# Patient Record
Sex: FEMALE | Race: BLACK OR AFRICAN AMERICAN | NOT HISPANIC OR LATINO
[De-identification: names, ages, dates, MRNs, and addresses within clinical notes are randomized per-mention and may not be internally consistent; named-entity substitution may affect disease eponyms.]

---

## 2017-03-15 ENCOUNTER — APPOINTMENT (OUTPATIENT)
Dept: OBGYN | Facility: CLINIC | Age: 27
End: 2017-03-15

## 2017-03-15 VITALS
BODY MASS INDEX: 34.36 KG/M2 | DIASTOLIC BLOOD PRESSURE: 84 MMHG | HEIGHT: 69 IN | WEIGHT: 232 LBS | SYSTOLIC BLOOD PRESSURE: 136 MMHG

## 2017-03-17 LAB
C TRACH RRNA SPEC QL NAA+PROBE: NORMAL
N GONORRHOEA RRNA SPEC QL NAA+PROBE: NORMAL
SOURCE TP AMPLIFICATION: NORMAL

## 2017-03-21 LAB — CYTOLOGY CVX/VAG DOC THIN PREP: NORMAL

## 2017-03-22 ENCOUNTER — APPOINTMENT (OUTPATIENT)
Dept: OBGYN | Facility: CLINIC | Age: 27
End: 2017-03-22

## 2017-04-05 ENCOUNTER — CHART COPY (OUTPATIENT)
Age: 27
End: 2017-04-05

## 2017-05-17 ENCOUNTER — APPOINTMENT (OUTPATIENT)
Dept: OBGYN | Facility: CLINIC | Age: 27
End: 2017-05-17

## 2018-04-18 ENCOUNTER — APPOINTMENT (OUTPATIENT)
Dept: OBGYN | Facility: CLINIC | Age: 28
End: 2018-04-18
Payer: COMMERCIAL

## 2018-04-18 VITALS
BODY MASS INDEX: 35.84 KG/M2 | DIASTOLIC BLOOD PRESSURE: 88 MMHG | SYSTOLIC BLOOD PRESSURE: 130 MMHG | HEIGHT: 69 IN | WEIGHT: 242 LBS

## 2018-04-18 LAB
HCG UR QL: POSITIVE
QUALITY CONTROL: YES

## 2018-04-18 PROCEDURE — 99213 OFFICE O/P EST LOW 20 MIN: CPT | Mod: 25

## 2018-04-18 PROCEDURE — 99395 PREV VISIT EST AGE 18-39: CPT

## 2018-04-18 PROCEDURE — 76817 TRANSVAGINAL US OBSTETRIC: CPT

## 2018-04-18 PROCEDURE — 81025 URINE PREGNANCY TEST: CPT

## 2018-04-19 ENCOUNTER — LABORATORY RESULT (OUTPATIENT)
Age: 28
End: 2018-04-19

## 2018-04-21 ENCOUNTER — LABORATORY RESULT (OUTPATIENT)
Age: 28
End: 2018-04-21

## 2018-04-23 LAB — CYTOLOGY CVX/VAG DOC THIN PREP: NORMAL

## 2018-04-24 ENCOUNTER — APPOINTMENT (OUTPATIENT)
Dept: OBGYN | Facility: CLINIC | Age: 28
End: 2018-04-24
Payer: COMMERCIAL

## 2018-04-24 ENCOUNTER — ASOB RESULT (OUTPATIENT)
Age: 28
End: 2018-04-24

## 2018-04-24 PROCEDURE — 76817 TRANSVAGINAL US OBSTETRIC: CPT

## 2018-05-01 ENCOUNTER — APPOINTMENT (OUTPATIENT)
Dept: ANTEPARTUM | Facility: CLINIC | Age: 28
End: 2018-05-01
Payer: COMMERCIAL

## 2018-05-01 ENCOUNTER — ASOB RESULT (OUTPATIENT)
Age: 28
End: 2018-05-01

## 2018-05-01 ENCOUNTER — APPOINTMENT (OUTPATIENT)
Dept: OBGYN | Facility: CLINIC | Age: 28
End: 2018-05-01
Payer: COMMERCIAL

## 2018-05-01 ENCOUNTER — NON-APPOINTMENT (OUTPATIENT)
Age: 28
End: 2018-05-01

## 2018-05-01 VITALS
BODY MASS INDEX: 35.4 KG/M2 | HEIGHT: 69 IN | SYSTOLIC BLOOD PRESSURE: 123 MMHG | WEIGHT: 239 LBS | DIASTOLIC BLOOD PRESSURE: 88 MMHG

## 2018-05-01 PROCEDURE — 0501F PRENATAL FLOW SHEET: CPT

## 2018-05-01 PROCEDURE — 76817 TRANSVAGINAL US OBSTETRIC: CPT

## 2018-05-12 ENCOUNTER — LABORATORY RESULT (OUTPATIENT)
Age: 28
End: 2018-05-12

## 2018-05-14 ENCOUNTER — LABORATORY RESULT (OUTPATIENT)
Age: 28
End: 2018-05-14

## 2018-05-22 ENCOUNTER — APPOINTMENT (OUTPATIENT)
Dept: OBGYN | Facility: CLINIC | Age: 28
End: 2018-05-22

## 2018-05-30 ENCOUNTER — APPOINTMENT (OUTPATIENT)
Dept: OBGYN | Facility: CLINIC | Age: 28
End: 2018-05-30
Payer: COMMERCIAL

## 2018-05-30 VITALS
DIASTOLIC BLOOD PRESSURE: 86 MMHG | WEIGHT: 240 LBS | SYSTOLIC BLOOD PRESSURE: 130 MMHG | HEIGHT: 69 IN | BODY MASS INDEX: 35.55 KG/M2

## 2018-05-30 PROCEDURE — 0502F SUBSEQUENT PRENATAL CARE: CPT

## 2018-06-01 ENCOUNTER — APPOINTMENT (OUTPATIENT)
Dept: ANTEPARTUM | Facility: CLINIC | Age: 28
End: 2018-06-01

## 2018-06-11 ENCOUNTER — APPOINTMENT (OUTPATIENT)
Dept: ANTEPARTUM | Facility: CLINIC | Age: 28
End: 2018-06-11

## 2018-06-11 ENCOUNTER — APPOINTMENT (OUTPATIENT)
Dept: ANTEPARTUM | Facility: CLINIC | Age: 28
End: 2018-06-11
Payer: COMMERCIAL

## 2018-06-11 ENCOUNTER — ASOB RESULT (OUTPATIENT)
Age: 28
End: 2018-06-11

## 2018-06-11 PROCEDURE — 36416 COLLJ CAPILLARY BLOOD SPEC: CPT

## 2018-06-11 PROCEDURE — 76813 OB US NUCHAL MEAS 1 GEST: CPT

## 2018-06-12 ENCOUNTER — TRANSCRIPTION ENCOUNTER (OUTPATIENT)
Age: 28
End: 2018-06-12

## 2018-06-14 LAB
1ST TRIMESTER DATA: NORMAL
ADDENDUM DOC: NORMAL
AFP PNL SERPL: NORMAL
AFP SERPL-ACNC: NORMAL
CLINICAL BIOCHEMIST REVIEW: NORMAL
FREE BETA HCG 1ST TRIMESTER: NORMAL
Lab: NORMAL
NOTES NTD: NORMAL
NT: NORMAL
PAPP-A SERPL-ACNC: NORMAL
TRISOMY 18/3: NORMAL

## 2018-06-20 ENCOUNTER — APPOINTMENT (OUTPATIENT)
Dept: ANTEPARTUM | Facility: CLINIC | Age: 28
End: 2018-06-20

## 2018-06-20 ENCOUNTER — ASOB RESULT (OUTPATIENT)
Age: 28
End: 2018-06-20

## 2018-06-20 ENCOUNTER — APPOINTMENT (OUTPATIENT)
Dept: MATERNAL FETAL MEDICINE | Facility: CLINIC | Age: 28
End: 2018-06-20
Payer: COMMERCIAL

## 2018-06-20 ENCOUNTER — TRANSCRIPTION ENCOUNTER (OUTPATIENT)
Age: 28
End: 2018-06-20

## 2018-06-20 PROCEDURE — 99241 OFFICE CONSULTATION NEW/ESTAB PATIENT 15 MIN: CPT

## 2018-06-26 ENCOUNTER — APPOINTMENT (OUTPATIENT)
Dept: OBGYN | Facility: CLINIC | Age: 28
End: 2018-06-26
Payer: COMMERCIAL

## 2018-06-26 VITALS
BODY MASS INDEX: 37.33 KG/M2 | WEIGHT: 252 LBS | HEIGHT: 69 IN | SYSTOLIC BLOOD PRESSURE: 138 MMHG | DIASTOLIC BLOOD PRESSURE: 84 MMHG

## 2018-06-26 PROCEDURE — 0502F SUBSEQUENT PRENATAL CARE: CPT

## 2018-07-11 ENCOUNTER — APPOINTMENT (OUTPATIENT)
Dept: OBGYN | Facility: CLINIC | Age: 28
End: 2018-07-11
Payer: COMMERCIAL

## 2018-07-11 VITALS
WEIGHT: 255 LBS | DIASTOLIC BLOOD PRESSURE: 90 MMHG | SYSTOLIC BLOOD PRESSURE: 128 MMHG | BODY MASS INDEX: 37.77 KG/M2 | HEIGHT: 69 IN

## 2018-07-11 PROCEDURE — 0502F SUBSEQUENT PRENATAL CARE: CPT

## 2018-07-30 ENCOUNTER — APPOINTMENT (OUTPATIENT)
Dept: ANTEPARTUM | Facility: CLINIC | Age: 28
End: 2018-07-30
Payer: COMMERCIAL

## 2018-07-30 ENCOUNTER — ASOB RESULT (OUTPATIENT)
Age: 28
End: 2018-07-30

## 2018-07-30 PROCEDURE — 76805 OB US >/= 14 WKS SNGL FETUS: CPT

## 2018-08-06 LAB
1ST TRIMESTER DATA: NORMAL
2ND TRIMESTER DATA: NORMAL
AFP PNL SERPL: NORMAL
AFP SERPL-ACNC: NORMAL
AFP SERPL-ACNC: NORMAL
B-HCG FREE SERPL-MCNC: NORMAL
CLINICAL BIOCHEMIST REVIEW: NORMAL
FREE BETA HCG 1ST TRIMESTER: NORMAL
INHIBIN A SERPL-MCNC: NORMAL
NOTES NTD: NORMAL
NT: NORMAL
PAPP-A SERPL-ACNC: NORMAL
U ESTRIOL SERPL-SCNC: NORMAL

## 2018-08-09 ENCOUNTER — APPOINTMENT (OUTPATIENT)
Dept: OBGYN | Facility: CLINIC | Age: 28
End: 2018-08-09
Payer: COMMERCIAL

## 2018-08-09 PROCEDURE — 0502F SUBSEQUENT PRENATAL CARE: CPT

## 2018-08-15 ENCOUNTER — APPOINTMENT (OUTPATIENT)
Dept: OBGYN | Facility: CLINIC | Age: 28
End: 2018-08-15
Payer: COMMERCIAL

## 2018-08-15 VITALS
WEIGHT: 271 LBS | DIASTOLIC BLOOD PRESSURE: 84 MMHG | BODY MASS INDEX: 40.14 KG/M2 | SYSTOLIC BLOOD PRESSURE: 128 MMHG | HEIGHT: 69 IN

## 2018-08-15 PROCEDURE — 0502F SUBSEQUENT PRENATAL CARE: CPT

## 2018-09-05 ENCOUNTER — APPOINTMENT (OUTPATIENT)
Dept: ANTEPARTUM | Facility: CLINIC | Age: 28
End: 2018-09-05
Payer: COMMERCIAL

## 2018-09-05 ENCOUNTER — APPOINTMENT (OUTPATIENT)
Dept: MATERNAL FETAL MEDICINE | Facility: CLINIC | Age: 28
End: 2018-09-05
Payer: COMMERCIAL

## 2018-09-05 ENCOUNTER — ASOB RESULT (OUTPATIENT)
Age: 28
End: 2018-09-05

## 2018-09-05 VITALS
HEIGHT: 69 IN | DIASTOLIC BLOOD PRESSURE: 84 MMHG | SYSTOLIC BLOOD PRESSURE: 148 MMHG | HEART RATE: 98 BPM | BODY MASS INDEX: 41.92 KG/M2 | WEIGHT: 283 LBS

## 2018-09-05 DIAGNOSIS — B97.7 PAPILLOMAVIRUS AS THE CAUSE OF DISEASES CLASSIFIED ELSEWHERE: ICD-10-CM

## 2018-09-05 DIAGNOSIS — Z82.69 FAMILY HISTORY OF OTHER DISEASES OF THE MUSCULOSKELETAL SYSTEM AND CONNECTIVE TISSUE: ICD-10-CM

## 2018-09-05 DIAGNOSIS — I10 ESSENTIAL (PRIMARY) HYPERTENSION: ICD-10-CM

## 2018-09-05 DIAGNOSIS — Z09 ENCOUNTER FOR FOLLOW-UP EXAMINATION AFTER COMPLETED TREATMENT FOR CONDITIONS OTHER THAN MALIGNANT NEOPLASM: ICD-10-CM

## 2018-09-05 DIAGNOSIS — Z87.42 PERSONAL HISTORY OF OTHER DISEASES OF THE FEMALE GENITAL TRACT: ICD-10-CM

## 2018-09-05 DIAGNOSIS — Z30.9 ENCOUNTER FOR CONTRACEPTIVE MANAGEMENT, UNSPECIFIED: ICD-10-CM

## 2018-09-05 DIAGNOSIS — Z32.01 ENCOUNTER FOR PREGNANCY TEST, RESULT POSITIVE: ICD-10-CM

## 2018-09-05 DIAGNOSIS — O21.9 VOMITING OF PREGNANCY, UNSPECIFIED: ICD-10-CM

## 2018-09-05 PROCEDURE — 99244 OFF/OP CNSLTJ NEW/EST MOD 40: CPT

## 2018-09-05 PROCEDURE — 76816 OB US FOLLOW-UP PER FETUS: CPT

## 2018-09-05 RX ORDER — IBUPROFEN 600 MG/1
600 TABLET, FILM COATED ORAL EVERY 6 HOURS
Qty: 40 | Refills: 0 | Status: DISCONTINUED | COMMUNITY
Start: 2017-04-05 | End: 2018-09-05

## 2018-09-05 RX ORDER — DOXYLAMINE SUCCINATE AND PYRIDOXINE HYDROCHLORIDE 10; 10 MG/1; MG/1
10-10 TABLET, DELAYED RELEASE ORAL
Qty: 60 | Refills: 1 | Status: DISCONTINUED | COMMUNITY
Start: 2018-05-04 | End: 2018-09-05

## 2018-09-05 RX ORDER — METHYLDOPA 250 MG/1
250 TABLET, FILM COATED ORAL
Qty: 60 | Refills: 1 | Status: DISCONTINUED | COMMUNITY
Start: 2018-08-15 | End: 2018-09-05

## 2018-09-05 RX ORDER — ONDANSETRON 4 MG/1
4 TABLET ORAL
Qty: 20 | Refills: 1 | Status: DISCONTINUED | COMMUNITY
Start: 2018-05-09 | End: 2018-09-05

## 2018-09-05 RX ORDER — METRONIDAZOLE 7.5 MG/G
0.75 GEL VAGINAL
Qty: 5 | Refills: 2 | Status: DISCONTINUED | COMMUNITY
Start: 2017-03-21 | End: 2018-09-05

## 2018-09-05 RX ORDER — IBUPROFEN 600 MG/1
600 TABLET, FILM COATED ORAL 3 TIMES DAILY
Qty: 30 | Refills: 1 | Status: DISCONTINUED | COMMUNITY
Start: 2017-04-27 | End: 2018-09-05

## 2018-09-05 RX ORDER — LABETALOL HYDROCHLORIDE 200 MG/1
200 TABLET, FILM COATED ORAL
Qty: 120 | Refills: 0 | Status: DISCONTINUED | COMMUNITY
Start: 2018-08-09 | End: 2018-09-05

## 2018-09-06 ENCOUNTER — APPOINTMENT (OUTPATIENT)
Dept: OBGYN | Facility: CLINIC | Age: 28
End: 2018-09-06

## 2018-09-17 ENCOUNTER — APPOINTMENT (OUTPATIENT)
Dept: OBGYN | Facility: CLINIC | Age: 28
End: 2018-09-17
Payer: COMMERCIAL

## 2018-09-17 PROCEDURE — 0502F SUBSEQUENT PRENATAL CARE: CPT

## 2018-09-24 ENCOUNTER — LABORATORY RESULT (OUTPATIENT)
Age: 28
End: 2018-09-24

## 2018-09-26 ENCOUNTER — APPOINTMENT (OUTPATIENT)
Dept: OBGYN | Facility: CLINIC | Age: 28
End: 2018-09-26
Payer: COMMERCIAL

## 2018-09-26 VITALS
DIASTOLIC BLOOD PRESSURE: 80 MMHG | BODY MASS INDEX: 42.36 KG/M2 | WEIGHT: 286 LBS | SYSTOLIC BLOOD PRESSURE: 125 MMHG | HEIGHT: 69 IN

## 2018-09-26 PROCEDURE — 59426 ANTEPARTUM CARE ONLY: CPT

## 2018-09-26 PROCEDURE — 0502F SUBSEQUENT PRENATAL CARE: CPT

## 2018-10-03 ENCOUNTER — APPOINTMENT (OUTPATIENT)
Dept: ANTEPARTUM | Facility: CLINIC | Age: 28
End: 2018-10-03
Payer: MEDICAID

## 2018-10-03 ENCOUNTER — ASOB RESULT (OUTPATIENT)
Age: 28
End: 2018-10-03

## 2018-10-03 ENCOUNTER — APPOINTMENT (OUTPATIENT)
Dept: MATERNAL FETAL MEDICINE | Facility: CLINIC | Age: 28
End: 2018-10-03
Payer: MEDICAID

## 2018-10-03 VITALS
BODY MASS INDEX: 42.65 KG/M2 | WEIGHT: 288.8 LBS | HEART RATE: 104 BPM | DIASTOLIC BLOOD PRESSURE: 72 MMHG | SYSTOLIC BLOOD PRESSURE: 148 MMHG

## 2018-10-03 PROCEDURE — 76816 OB US FOLLOW-UP PER FETUS: CPT

## 2018-10-03 PROCEDURE — 99244 OFF/OP CNSLTJ NEW/EST MOD 40: CPT

## 2018-10-04 ENCOUNTER — ASOB RESULT (OUTPATIENT)
Age: 28
End: 2018-10-04

## 2018-10-04 ENCOUNTER — APPOINTMENT (OUTPATIENT)
Dept: MATERNAL FETAL MEDICINE | Facility: CLINIC | Age: 28
End: 2018-10-04
Payer: SELF-PAY

## 2018-10-04 VITALS — BODY MASS INDEX: 41.91 KG/M2 | WEIGHT: 289.44 LBS | HEIGHT: 69.5 IN

## 2018-10-04 PROCEDURE — G0108 DIAB MANAGE TRN  PER INDIV: CPT

## 2018-10-18 ENCOUNTER — ASOB RESULT (OUTPATIENT)
Age: 28
End: 2018-10-18

## 2018-10-18 ENCOUNTER — APPOINTMENT (OUTPATIENT)
Dept: MATERNAL FETAL MEDICINE | Facility: CLINIC | Age: 28
End: 2018-10-18
Payer: MEDICAID

## 2018-10-18 VITALS — HEIGHT: 69.5 IN | BODY MASS INDEX: 42.06 KG/M2 | WEIGHT: 290.5 LBS

## 2018-10-18 PROCEDURE — G0108 DIAB MANAGE TRN  PER INDIV: CPT

## 2018-10-18 RX ORDER — BLOOD-GLUCOSE METER
W/DEVICE KIT MISCELLANEOUS
Qty: 1 | Refills: 0 | Status: DISCONTINUED | COMMUNITY
Start: 2018-10-04 | End: 2018-10-18

## 2018-10-18 RX ORDER — LANCETS 28 GAUGE
EACH MISCELLANEOUS
Qty: 2 | Refills: 1 | Status: DISCONTINUED | COMMUNITY
Start: 2018-10-04 | End: 2018-10-18

## 2018-10-18 RX ORDER — BLOOD SUGAR DIAGNOSTIC
STRIP MISCELLANEOUS
Qty: 2 | Refills: 1 | Status: DISCONTINUED | COMMUNITY
Start: 2018-10-04 | End: 2018-10-18

## 2018-11-01 ENCOUNTER — APPOINTMENT (OUTPATIENT)
Dept: MATERNAL FETAL MEDICINE | Facility: CLINIC | Age: 28
End: 2018-11-01
Payer: MEDICAID

## 2018-11-01 ENCOUNTER — ASOB RESULT (OUTPATIENT)
Age: 28
End: 2018-11-01

## 2018-11-01 VITALS — WEIGHT: 292.44 LBS | BODY MASS INDEX: 42.34 KG/M2 | HEIGHT: 69.5 IN

## 2018-11-01 PROCEDURE — G0108 DIAB MANAGE TRN  PER INDIV: CPT

## 2018-11-06 ENCOUNTER — APPOINTMENT (OUTPATIENT)
Dept: OBGYN | Facility: CLINIC | Age: 28
End: 2018-11-06
Payer: MEDICAID

## 2018-11-06 VITALS
WEIGHT: 291 LBS | DIASTOLIC BLOOD PRESSURE: 76 MMHG | BODY MASS INDEX: 42.13 KG/M2 | HEIGHT: 69.5 IN | SYSTOLIC BLOOD PRESSURE: 126 MMHG

## 2018-11-06 PROCEDURE — 0502F SUBSEQUENT PRENATAL CARE: CPT

## 2018-11-07 ENCOUNTER — APPOINTMENT (OUTPATIENT)
Dept: ANTEPARTUM | Facility: CLINIC | Age: 28
End: 2018-11-07
Payer: MEDICAID

## 2018-11-07 ENCOUNTER — ASOB RESULT (OUTPATIENT)
Age: 28
End: 2018-11-07

## 2018-11-07 ENCOUNTER — APPOINTMENT (OUTPATIENT)
Dept: MATERNAL FETAL MEDICINE | Facility: CLINIC | Age: 28
End: 2018-11-07
Payer: MEDICAID

## 2018-11-07 VITALS
BODY MASS INDEX: 42.42 KG/M2 | HEIGHT: 69.5 IN | WEIGHT: 293 LBS | DIASTOLIC BLOOD PRESSURE: 66 MMHG | SYSTOLIC BLOOD PRESSURE: 128 MMHG | HEART RATE: 90 BPM

## 2018-11-07 DIAGNOSIS — O24.419 GESTATIONAL DIABETES MELLITUS IN PREGNANCY, UNSPECIFIED CONTROL: ICD-10-CM

## 2018-11-07 PROCEDURE — 76820 UMBILICAL ARTERY ECHO: CPT

## 2018-11-07 PROCEDURE — 76816 OB US FOLLOW-UP PER FETUS: CPT

## 2018-11-07 PROCEDURE — 93976 VASCULAR STUDY: CPT

## 2018-11-07 PROCEDURE — 99244 OFF/OP CNSLTJ NEW/EST MOD 40: CPT

## 2018-11-07 PROCEDURE — 76819 FETAL BIOPHYS PROFIL W/O NST: CPT

## 2018-11-14 ENCOUNTER — APPOINTMENT (OUTPATIENT)
Dept: OBGYN | Facility: CLINIC | Age: 28
End: 2018-11-14
Payer: MEDICAID

## 2018-11-14 VITALS
DIASTOLIC BLOOD PRESSURE: 88 MMHG | BODY MASS INDEX: 42.13 KG/M2 | WEIGHT: 291 LBS | SYSTOLIC BLOOD PRESSURE: 136 MMHG | HEIGHT: 69.5 IN

## 2018-11-14 PROCEDURE — 0502F SUBSEQUENT PRENATAL CARE: CPT

## 2018-11-14 PROCEDURE — 59025 FETAL NON-STRESS TEST: CPT

## 2018-11-19 ENCOUNTER — LABORATORY RESULT (OUTPATIENT)
Age: 28
End: 2018-11-19

## 2018-11-20 ENCOUNTER — APPOINTMENT (OUTPATIENT)
Dept: OBGYN | Facility: CLINIC | Age: 28
End: 2018-11-20
Payer: MEDICAID

## 2018-11-20 VITALS
SYSTOLIC BLOOD PRESSURE: 129 MMHG | WEIGHT: 293 LBS | HEIGHT: 69.5 IN | BODY MASS INDEX: 42.42 KG/M2 | DIASTOLIC BLOOD PRESSURE: 85 MMHG

## 2018-11-20 PROCEDURE — 76818 FETAL BIOPHYS PROFILE W/NST: CPT

## 2018-11-20 PROCEDURE — 0502F SUBSEQUENT PRENATAL CARE: CPT

## 2018-11-21 ENCOUNTER — APPOINTMENT (OUTPATIENT)
Dept: MATERNAL FETAL MEDICINE | Facility: CLINIC | Age: 28
End: 2018-11-21
Payer: MEDICAID

## 2018-11-21 ENCOUNTER — ASOB RESULT (OUTPATIENT)
Age: 28
End: 2018-11-21

## 2018-11-21 VITALS — HEIGHT: 69.5 IN | WEIGHT: 293 LBS | BODY MASS INDEX: 42.42 KG/M2

## 2018-11-21 PROCEDURE — G0108 DIAB MANAGE TRN  PER INDIV: CPT

## 2018-11-25 ENCOUNTER — LABORATORY RESULT (OUTPATIENT)
Age: 28
End: 2018-11-25

## 2018-11-26 ENCOUNTER — LABORATORY RESULT (OUTPATIENT)
Age: 28
End: 2018-11-26

## 2018-11-26 ENCOUNTER — APPOINTMENT (OUTPATIENT)
Dept: ANTEPARTUM | Facility: CLINIC | Age: 28
End: 2018-11-26
Payer: MEDICAID

## 2018-11-26 ENCOUNTER — APPOINTMENT (OUTPATIENT)
Dept: OBGYN | Facility: CLINIC | Age: 28
End: 2018-11-26
Payer: MEDICAID

## 2018-11-26 ENCOUNTER — RX RENEWAL (OUTPATIENT)
Age: 28
End: 2018-11-26

## 2018-11-26 ENCOUNTER — ASOB RESULT (OUTPATIENT)
Age: 28
End: 2018-11-26

## 2018-11-26 VITALS
HEIGHT: 69.5 IN | DIASTOLIC BLOOD PRESSURE: 85 MMHG | BODY MASS INDEX: 42.42 KG/M2 | SYSTOLIC BLOOD PRESSURE: 125 MMHG | WEIGHT: 293 LBS

## 2018-11-26 PROCEDURE — 59025 FETAL NON-STRESS TEST: CPT

## 2018-11-26 PROCEDURE — 76815 OB US LIMITED FETUS(S): CPT

## 2018-11-26 PROCEDURE — 0502F SUBSEQUENT PRENATAL CARE: CPT

## 2018-11-26 PROCEDURE — 76819 FETAL BIOPHYS PROFIL W/O NST: CPT

## 2018-12-03 ENCOUNTER — APPOINTMENT (OUTPATIENT)
Dept: OBGYN | Facility: CLINIC | Age: 28
End: 2018-12-03
Payer: MEDICAID

## 2018-12-04 ENCOUNTER — APPOINTMENT (OUTPATIENT)
Dept: OBGYN | Facility: CLINIC | Age: 28
End: 2018-12-04
Payer: MEDICAID

## 2018-12-04 VITALS
BODY MASS INDEX: 42.42 KG/M2 | WEIGHT: 293 LBS | HEIGHT: 69.5 IN | SYSTOLIC BLOOD PRESSURE: 130 MMHG | DIASTOLIC BLOOD PRESSURE: 80 MMHG

## 2018-12-04 PROCEDURE — 76818 FETAL BIOPHYS PROFILE W/NST: CPT

## 2018-12-04 PROCEDURE — 0502F SUBSEQUENT PRENATAL CARE: CPT

## 2018-12-05 ENCOUNTER — APPOINTMENT (OUTPATIENT)
Dept: MATERNAL FETAL MEDICINE | Facility: CLINIC | Age: 28
End: 2018-12-05
Payer: MEDICAID

## 2018-12-05 ENCOUNTER — APPOINTMENT (OUTPATIENT)
Dept: ANTEPARTUM | Facility: CLINIC | Age: 28
End: 2018-12-05
Payer: MEDICAID

## 2018-12-05 ENCOUNTER — ASOB RESULT (OUTPATIENT)
Age: 28
End: 2018-12-05

## 2018-12-05 VITALS
BODY MASS INDEX: 42.42 KG/M2 | DIASTOLIC BLOOD PRESSURE: 76 MMHG | SYSTOLIC BLOOD PRESSURE: 138 MMHG | HEIGHT: 69.5 IN | WEIGHT: 293 LBS

## 2018-12-05 PROCEDURE — 99215 OFFICE O/P EST HI 40 MIN: CPT

## 2018-12-05 PROCEDURE — 76816 OB US FOLLOW-UP PER FETUS: CPT

## 2018-12-05 PROCEDURE — 93976 VASCULAR STUDY: CPT

## 2018-12-05 PROCEDURE — 76819 FETAL BIOPHYS PROFIL W/O NST: CPT

## 2018-12-05 PROCEDURE — 76820 UMBILICAL ARTERY ECHO: CPT

## 2018-12-12 ENCOUNTER — APPOINTMENT (OUTPATIENT)
Dept: MATERNAL FETAL MEDICINE | Facility: CLINIC | Age: 28
End: 2018-12-12

## 2018-12-13 ENCOUNTER — APPOINTMENT (OUTPATIENT)
Dept: OBGYN | Facility: CLINIC | Age: 28
End: 2018-12-13
Payer: MEDICAID

## 2018-12-13 VITALS
SYSTOLIC BLOOD PRESSURE: 131 MMHG | DIASTOLIC BLOOD PRESSURE: 78 MMHG | WEIGHT: 293 LBS | HEIGHT: 69.5 IN | BODY MASS INDEX: 42.42 KG/M2

## 2018-12-13 PROCEDURE — 0502F SUBSEQUENT PRENATAL CARE: CPT

## 2018-12-13 PROCEDURE — 59025 FETAL NON-STRESS TEST: CPT

## 2018-12-18 ENCOUNTER — APPOINTMENT (OUTPATIENT)
Dept: OBGYN | Facility: CLINIC | Age: 28
End: 2018-12-18
Payer: MEDICAID

## 2018-12-18 ENCOUNTER — INPATIENT (INPATIENT)
Facility: HOSPITAL | Age: 28
LOS: 2 days | Discharge: HOME | End: 2018-12-21
Attending: OBSTETRICS & GYNECOLOGY | Admitting: OBSTETRICS & GYNECOLOGY
Payer: MEDICAID

## 2018-12-18 VITALS
SYSTOLIC BLOOD PRESSURE: 107 MMHG | HEART RATE: 116 BPM | TEMPERATURE: 98 F | WEIGHT: 293 LBS | DIASTOLIC BLOOD PRESSURE: 66 MMHG | RESPIRATION RATE: 16 BRPM | HEIGHT: 68 IN

## 2018-12-18 VITALS
DIASTOLIC BLOOD PRESSURE: 91 MMHG | BODY MASS INDEX: 42.42 KG/M2 | SYSTOLIC BLOOD PRESSURE: 132 MMHG | HEIGHT: 69.5 IN | WEIGHT: 293 LBS

## 2018-12-18 DIAGNOSIS — Z90.89 ACQUIRED ABSENCE OF OTHER ORGANS: Chronic | ICD-10-CM

## 2018-12-18 DIAGNOSIS — O47.1 FALSE LABOR AT OR AFTER 37 COMPLETED WEEKS OF GESTATION: ICD-10-CM

## 2018-12-18 LAB
ALBUMIN SERPL ELPH-MCNC: 3.9 G/DL — SIGNIFICANT CHANGE UP (ref 3.3–5.2)
ALP SERPL-CCNC: 140 U/L — HIGH (ref 40–120)
ALT FLD-CCNC: 12 U/L — SIGNIFICANT CHANGE UP
ANION GAP SERPL CALC-SCNC: 15 MMOL/L — SIGNIFICANT CHANGE UP (ref 5–17)
APPEARANCE UR: CLEAR — SIGNIFICANT CHANGE UP
APTT BLD: 28.4 SEC — SIGNIFICANT CHANGE UP (ref 27.5–36.3)
AST SERPL-CCNC: 18 U/L — SIGNIFICANT CHANGE UP
BASOPHILS # BLD AUTO: 0 K/UL — SIGNIFICANT CHANGE UP (ref 0–0.2)
BASOPHILS NFR BLD AUTO: 0.1 % — SIGNIFICANT CHANGE UP (ref 0–2)
BILIRUB SERPL-MCNC: 0.8 MG/DL — SIGNIFICANT CHANGE UP (ref 0.4–2)
BILIRUB UR-MCNC: ABNORMAL
BLD GP AB SCN SERPL QL: SIGNIFICANT CHANGE UP
BUN SERPL-MCNC: 9 MG/DL — SIGNIFICANT CHANGE UP (ref 8–20)
CALCIUM SERPL-MCNC: 8.9 MG/DL — SIGNIFICANT CHANGE UP (ref 8.6–10.2)
CHLORIDE SERPL-SCNC: 102 MMOL/L — SIGNIFICANT CHANGE UP (ref 98–107)
CO2 SERPL-SCNC: 19 MMOL/L — LOW (ref 22–29)
COLOR SPEC: YELLOW — SIGNIFICANT CHANGE UP
CREAT SERPL-MCNC: 0.39 MG/DL — LOW (ref 0.5–1.3)
DIFF PNL FLD: ABNORMAL
EOSINOPHIL # BLD AUTO: 0.1 K/UL — SIGNIFICANT CHANGE UP (ref 0–0.5)
EOSINOPHIL NFR BLD AUTO: 1.2 % — SIGNIFICANT CHANGE UP (ref 0–6)
EPI CELLS # UR: ABNORMAL
FIBRINOGEN PPP-MCNC: 690 MG/DL — HIGH (ref 350–510)
GLUCOSE BLDC GLUCOMTR-MCNC: 120 MG/DL — HIGH (ref 70–99)
GLUCOSE BLDC GLUCOMTR-MCNC: 88 MG/DL — SIGNIFICANT CHANGE UP (ref 70–99)
GLUCOSE SERPL-MCNC: 116 MG/DL — HIGH (ref 70–115)
GLUCOSE UR QL: NEGATIVE MG/DL — SIGNIFICANT CHANGE UP
HCT VFR BLD CALC: 36.3 % — LOW (ref 37–47)
HGB BLD-MCNC: 11.7 G/DL — LOW (ref 12–16)
INR BLD: 1.01 RATIO — SIGNIFICANT CHANGE UP (ref 0.88–1.16)
KETONES UR-MCNC: ABNORMAL
LDH SERPL L TO P-CCNC: 148 U/L — SIGNIFICANT CHANGE UP (ref 98–192)
LEUKOCYTE ESTERASE UR-ACNC: ABNORMAL
LYMPHOCYTES # BLD AUTO: 2.6 K/UL — SIGNIFICANT CHANGE UP (ref 1–4.8)
LYMPHOCYTES # BLD AUTO: 27.5 % — SIGNIFICANT CHANGE UP (ref 20–55)
MCHC RBC-ENTMCNC: 27.5 PG — SIGNIFICANT CHANGE UP (ref 27–31)
MCHC RBC-ENTMCNC: 32.2 G/DL — SIGNIFICANT CHANGE UP (ref 32–36)
MCV RBC AUTO: 85.4 FL — SIGNIFICANT CHANGE UP (ref 81–99)
MONOCYTES # BLD AUTO: 0.8 K/UL — SIGNIFICANT CHANGE UP (ref 0–0.8)
MONOCYTES NFR BLD AUTO: 8.5 % — SIGNIFICANT CHANGE UP (ref 3–10)
NEUTROPHILS # BLD AUTO: 6 K/UL — SIGNIFICANT CHANGE UP (ref 1.8–8)
NEUTROPHILS NFR BLD AUTO: 62.4 % — SIGNIFICANT CHANGE UP (ref 37–73)
NITRITE UR-MCNC: NEGATIVE — SIGNIFICANT CHANGE UP
PH UR: 6.5 — SIGNIFICANT CHANGE UP (ref 5–8)
PLATELET # BLD AUTO: 189 K/UL — SIGNIFICANT CHANGE UP (ref 150–400)
POTASSIUM SERPL-MCNC: 3.6 MMOL/L — SIGNIFICANT CHANGE UP (ref 3.5–5.3)
POTASSIUM SERPL-SCNC: 3.6 MMOL/L — SIGNIFICANT CHANGE UP (ref 3.5–5.3)
PROT SERPL-MCNC: 7.1 G/DL — SIGNIFICANT CHANGE UP (ref 6.6–8.7)
PROT UR-MCNC: 100 MG/DL
PROTHROM AB SERPL-ACNC: 11.6 SEC — SIGNIFICANT CHANGE UP (ref 10–12.9)
RBC # BLD: 4.25 M/UL — LOW (ref 4.4–5.2)
RBC # FLD: 14.1 % — SIGNIFICANT CHANGE UP (ref 11–15.6)
RBC CASTS # UR COMP ASSIST: SIGNIFICANT CHANGE UP /HPF (ref 0–4)
SODIUM SERPL-SCNC: 136 MMOL/L — SIGNIFICANT CHANGE UP (ref 135–145)
SP GR SPEC: 1.02 — SIGNIFICANT CHANGE UP (ref 1.01–1.02)
TYPE + AB SCN PNL BLD: SIGNIFICANT CHANGE UP
URATE SERPL-MCNC: 3.8 MG/DL — SIGNIFICANT CHANGE UP (ref 2.4–5.7)
UROBILINOGEN FLD QL: 4 MG/DL
WBC # BLD: 9.5 K/UL — SIGNIFICANT CHANGE UP (ref 4.8–10.8)
WBC # FLD AUTO: 9.5 K/UL — SIGNIFICANT CHANGE UP (ref 4.8–10.8)
WBC UR QL: SIGNIFICANT CHANGE UP

## 2018-12-18 PROCEDURE — 59426 ANTEPARTUM CARE ONLY: CPT

## 2018-12-18 PROCEDURE — 76818 FETAL BIOPHYS PROFILE W/NST: CPT

## 2018-12-18 PROCEDURE — 0502F SUBSEQUENT PRENATAL CARE: CPT

## 2018-12-18 RX ORDER — CITRIC ACID/SODIUM CITRATE 300-500 MG
30 SOLUTION, ORAL ORAL ONCE
Qty: 0 | Refills: 0 | Status: DISCONTINUED | OUTPATIENT
Start: 2018-12-18 | End: 2018-12-19

## 2018-12-18 RX ORDER — SODIUM CHLORIDE 9 MG/ML
1000 INJECTION, SOLUTION INTRAVENOUS
Qty: 0 | Refills: 0 | Status: DISCONTINUED | OUTPATIENT
Start: 2018-12-18 | End: 2018-12-19

## 2018-12-18 RX ORDER — OXYTOCIN 10 UNIT/ML
333.33 VIAL (ML) INJECTION
Qty: 20 | Refills: 0 | Status: COMPLETED | OUTPATIENT
Start: 2018-12-18

## 2018-12-18 RX ORDER — SODIUM CHLORIDE 9 MG/ML
1000 INJECTION, SOLUTION INTRAVENOUS ONCE
Qty: 0 | Refills: 0 | Status: COMPLETED | OUTPATIENT
Start: 2018-12-18 | End: 2018-12-19

## 2018-12-18 RX ORDER — METHYLDOPA 250 MG
500 TABLET ORAL EVERY 12 HOURS
Qty: 0 | Refills: 0 | Status: DISCONTINUED | OUTPATIENT
Start: 2018-12-18 | End: 2018-12-21

## 2018-12-18 RX ADMIN — SODIUM CHLORIDE 125 MILLILITER(S): 9 INJECTION, SOLUTION INTRAVENOUS at 19:27

## 2018-12-18 RX ADMIN — Medication 500 MILLIGRAM(S): at 22:01

## 2018-12-18 NOTE — OB PROVIDER H&P - HISTORY OF PRESENT ILLNESS
28 year old female  at 39 and 2/7 weeks presents for induction of labor due to GDM-A2 and HTN.  The patient has been maintained on methyldopa 500mg BID.  She feels well.  She admits to good FM.  Denies VB, LOF, and ctx.  Denies symptoms of pre-eclampsia.

## 2018-12-18 NOTE — OB PROVIDER H&P - ATTENDING COMMENTS
Patient seen and examined at bedside with me.  Agree with details of resident note.  28 year old female  at 39+ weeks with HTN and GDMA2.  She has no significant past medical history.  She is feeling well today.  Good FM.  Denies VB, LOF and ctx.  She denies symptoms of preeclampsia.  VSS.  FHT reassuring.  [place] with occasional ctx.  VE: 2/80/-2.  Plan for IOL with cytotec.  Finger sticks q 2 hours.  Will monitor BP's.  Continue methyldopa 500 mg BID.  Pain meds as needed.

## 2018-12-18 NOTE — OB PROVIDER H&P - ASSESSMENT
29yo  @ 39.2w a/f IOL at term, pregnancy complicated by cHTN vs gHTN and GDMA2.  - admit patient for expectant vaginal delivery  - admission orders  - baseline pre-e labs

## 2018-12-18 NOTE — OB PROVIDER H&P - NSHPLABSRESULTS_GEN_ALL_CORE
ICU Vital Signs Last 24 Hrs  T(C): 36.7 (18 Dec 2018 19:34), Max: 36.7 (18 Dec 2018 19:28)  T(F): 98.06 (18 Dec 2018 19:34), Max: 98.1 (18 Dec 2018 19:28)  HR: 102 (18 Dec 2018 20:16) (102 - 116)  BP: 115/80 (18 Dec 2018 20:16) (107/66 - 115/80)  BP(mean): --  ABP: --  ABP(mean): --  RR: 16 (18 Dec 2018 19:34) (16 - 16)  SpO2: --

## 2018-12-18 NOTE — OB RN PATIENT PROFILE - FAMILY HISTORY
Mother  Still living? Yes, Estimated age: Age Unknown  Family history of diabetes mellitus, Age at diagnosis: Age Unknown     Grandparent  Still living? No  Family history of diabetes mellitus, Age at diagnosis: Age Unknown     Aunt  Still living? Yes, Estimated age: Age Unknown  Family history of diabetes mellitus, Age at diagnosis: Age Unknown

## 2018-12-19 ENCOUNTER — TRANSCRIPTION ENCOUNTER (OUTPATIENT)
Age: 28
End: 2018-12-19

## 2018-12-19 LAB
GLUCOSE BLDC GLUCOMTR-MCNC: 169 MG/DL — HIGH (ref 70–99)
GLUCOSE BLDC GLUCOMTR-MCNC: 74 MG/DL — SIGNIFICANT CHANGE UP (ref 70–99)
GLUCOSE BLDC GLUCOMTR-MCNC: 74 MG/DL — SIGNIFICANT CHANGE UP (ref 70–99)
GLUCOSE BLDC GLUCOMTR-MCNC: 77 MG/DL — SIGNIFICANT CHANGE UP (ref 70–99)
GLUCOSE BLDC GLUCOMTR-MCNC: 78 MG/DL — SIGNIFICANT CHANGE UP (ref 70–99)
GLUCOSE BLDC GLUCOMTR-MCNC: 80 MG/DL — SIGNIFICANT CHANGE UP (ref 70–99)
GLUCOSE BLDC GLUCOMTR-MCNC: 85 MG/DL — SIGNIFICANT CHANGE UP (ref 70–99)
GLUCOSE BLDC GLUCOMTR-MCNC: 93 MG/DL — SIGNIFICANT CHANGE UP (ref 70–99)
T PALLIDUM AB TITR SER: NEGATIVE — SIGNIFICANT CHANGE UP

## 2018-12-19 PROCEDURE — 59410 OBSTETRICAL CARE: CPT | Mod: U9

## 2018-12-19 RX ORDER — DEXTROSE 50 % IN WATER 50 %
25 SYRINGE (ML) INTRAVENOUS ONCE
Qty: 0 | Refills: 0 | Status: DISCONTINUED | OUTPATIENT
Start: 2018-12-19 | End: 2018-12-20

## 2018-12-19 RX ORDER — AER TRAVELER 0.5 G/1
1 SOLUTION RECTAL; TOPICAL EVERY 4 HOURS
Qty: 0 | Refills: 0 | Status: DISCONTINUED | OUTPATIENT
Start: 2018-12-19 | End: 2018-12-21

## 2018-12-19 RX ORDER — LANOLIN
1 OINTMENT (GRAM) TOPICAL EVERY 6 HOURS
Qty: 0 | Refills: 0 | Status: DISCONTINUED | OUTPATIENT
Start: 2018-12-19 | End: 2018-12-21

## 2018-12-19 RX ORDER — PRAMOXINE HYDROCHLORIDE 150 MG/15G
1 AEROSOL, FOAM RECTAL EVERY 4 HOURS
Qty: 0 | Refills: 0 | Status: DISCONTINUED | OUTPATIENT
Start: 2018-12-19 | End: 2018-12-21

## 2018-12-19 RX ORDER — OXYTOCIN 10 UNIT/ML
333.33 VIAL (ML) INJECTION
Qty: 20 | Refills: 0 | Status: DISCONTINUED | OUTPATIENT
Start: 2018-12-19 | End: 2018-12-21

## 2018-12-19 RX ORDER — GLYCERIN ADULT
1 SUPPOSITORY, RECTAL RECTAL AT BEDTIME
Qty: 0 | Refills: 0 | Status: DISCONTINUED | OUTPATIENT
Start: 2018-12-19 | End: 2018-12-21

## 2018-12-19 RX ORDER — TETANUS TOXOID, REDUCED DIPHTHERIA TOXOID AND ACELLULAR PERTUSSIS VACCINE, ADSORBED 5; 2.5; 8; 8; 2.5 [IU]/.5ML; [IU]/.5ML; UG/.5ML; UG/.5ML; UG/.5ML
0.5 SUSPENSION INTRAMUSCULAR ONCE
Qty: 0 | Refills: 0 | Status: DISCONTINUED | OUTPATIENT
Start: 2018-12-19 | End: 2018-12-21

## 2018-12-19 RX ORDER — HYDROCORTISONE 1 %
1 OINTMENT (GRAM) TOPICAL EVERY 4 HOURS
Qty: 0 | Refills: 0 | Status: DISCONTINUED | OUTPATIENT
Start: 2018-12-19 | End: 2018-12-21

## 2018-12-19 RX ORDER — DOCUSATE SODIUM 100 MG
100 CAPSULE ORAL
Qty: 0 | Refills: 0 | Status: DISCONTINUED | OUTPATIENT
Start: 2018-12-19 | End: 2018-12-21

## 2018-12-19 RX ORDER — SODIUM CHLORIDE 9 MG/ML
1000 INJECTION, SOLUTION INTRAVENOUS
Qty: 0 | Refills: 0 | Status: DISCONTINUED | OUTPATIENT
Start: 2018-12-19 | End: 2018-12-20

## 2018-12-19 RX ORDER — DEXTROSE 50 % IN WATER 50 %
15 SYRINGE (ML) INTRAVENOUS ONCE
Qty: 0 | Refills: 0 | Status: DISCONTINUED | OUTPATIENT
Start: 2018-12-19 | End: 2018-12-20

## 2018-12-19 RX ORDER — METHYLDOPA 250 MG
500 TABLET ORAL
Qty: 0 | Refills: 0 | Status: DISCONTINUED | OUTPATIENT
Start: 2018-12-19 | End: 2018-12-19

## 2018-12-19 RX ORDER — DIPHENHYDRAMINE HCL 50 MG
25 CAPSULE ORAL EVERY 6 HOURS
Qty: 0 | Refills: 0 | Status: DISCONTINUED | OUTPATIENT
Start: 2018-12-19 | End: 2018-12-21

## 2018-12-19 RX ORDER — DIBUCAINE 1 %
1 OINTMENT (GRAM) RECTAL EVERY 4 HOURS
Qty: 0 | Refills: 0 | Status: DISCONTINUED | OUTPATIENT
Start: 2018-12-19 | End: 2018-12-21

## 2018-12-19 RX ORDER — SODIUM CHLORIDE 9 MG/ML
3 INJECTION INTRAMUSCULAR; INTRAVENOUS; SUBCUTANEOUS EVERY 8 HOURS
Qty: 0 | Refills: 0 | Status: DISCONTINUED | OUTPATIENT
Start: 2018-12-19 | End: 2018-12-21

## 2018-12-19 RX ORDER — OXYTOCIN 10 UNIT/ML
41.67 VIAL (ML) INJECTION
Qty: 20 | Refills: 0 | Status: DISCONTINUED | OUTPATIENT
Start: 2018-12-19 | End: 2018-12-21

## 2018-12-19 RX ORDER — DEXTROSE 50 % IN WATER 50 %
12.5 SYRINGE (ML) INTRAVENOUS ONCE
Qty: 0 | Refills: 0 | Status: DISCONTINUED | OUTPATIENT
Start: 2018-12-19 | End: 2018-12-20

## 2018-12-19 RX ORDER — INSULIN LISPRO 100/ML
VIAL (ML) SUBCUTANEOUS
Qty: 0 | Refills: 0 | Status: DISCONTINUED | OUTPATIENT
Start: 2018-12-19 | End: 2018-12-20

## 2018-12-19 RX ORDER — OXYCODONE AND ACETAMINOPHEN 5; 325 MG/1; MG/1
2 TABLET ORAL EVERY 6 HOURS
Qty: 0 | Refills: 0 | Status: DISCONTINUED | OUTPATIENT
Start: 2018-12-19 | End: 2018-12-21

## 2018-12-19 RX ORDER — ACETAMINOPHEN 500 MG
650 TABLET ORAL EVERY 6 HOURS
Qty: 0 | Refills: 0 | Status: DISCONTINUED | OUTPATIENT
Start: 2018-12-19 | End: 2018-12-21

## 2018-12-19 RX ORDER — MAGNESIUM HYDROXIDE 400 MG/1
30 TABLET, CHEWABLE ORAL
Qty: 0 | Refills: 0 | Status: DISCONTINUED | OUTPATIENT
Start: 2018-12-19 | End: 2018-12-21

## 2018-12-19 RX ORDER — SIMETHICONE 80 MG/1
80 TABLET, CHEWABLE ORAL EVERY 6 HOURS
Qty: 0 | Refills: 0 | Status: DISCONTINUED | OUTPATIENT
Start: 2018-12-19 | End: 2018-12-21

## 2018-12-19 RX ORDER — OXYTOCIN 10 UNIT/ML
2 VIAL (ML) INJECTION
Qty: 30 | Refills: 0 | Status: DISCONTINUED | OUTPATIENT
Start: 2018-12-19 | End: 2018-12-21

## 2018-12-19 RX ORDER — GLUCAGON INJECTION, SOLUTION 0.5 MG/.1ML
1 INJECTION, SOLUTION SUBCUTANEOUS ONCE
Qty: 0 | Refills: 0 | Status: DISCONTINUED | OUTPATIENT
Start: 2018-12-19 | End: 2018-12-20

## 2018-12-19 RX ORDER — IBUPROFEN 200 MG
600 TABLET ORAL EVERY 6 HOURS
Qty: 0 | Refills: 0 | Status: DISCONTINUED | OUTPATIENT
Start: 2018-12-19 | End: 2018-12-21

## 2018-12-19 RX ADMIN — Medication 500 MILLIGRAM(S): at 08:05

## 2018-12-19 RX ADMIN — Medication 1000 MILLIUNIT(S)/MIN: at 15:52

## 2018-12-19 RX ADMIN — Medication 125 MILLIUNIT(S)/MIN: at 16:26

## 2018-12-19 RX ADMIN — SODIUM CHLORIDE 125 MILLILITER(S): 9 INJECTION, SOLUTION INTRAVENOUS at 10:10

## 2018-12-19 RX ADMIN — OXYCODONE AND ACETAMINOPHEN 2 TABLET(S): 5; 325 TABLET ORAL at 18:15

## 2018-12-19 RX ADMIN — OXYCODONE AND ACETAMINOPHEN 2 TABLET(S): 5; 325 TABLET ORAL at 18:45

## 2018-12-19 RX ADMIN — Medication 2 MILLIUNIT(S)/MIN: at 11:25

## 2018-12-19 RX ADMIN — Medication 500 MILLIGRAM(S): at 21:23

## 2018-12-19 RX ADMIN — SODIUM CHLORIDE 2000 MILLILITER(S): 9 INJECTION, SOLUTION INTRAVENOUS at 09:40

## 2018-12-19 NOTE — DISCHARGE NOTE OB - PLAN OF CARE
recovery delivered via spontaneous vaginal delivery. She was transferred to postpartum unit without complications during her stay. Upon discharge she is voiding, tolerating PO, ambulating, and pain is controlled.

## 2018-12-19 NOTE — DISCHARGE NOTE OB - CARE PLAN
Principal Discharge DX:	 (normal spontaneous vaginal delivery)  Goal:	recovery  Assessment and plan of treatment:	delivered via spontaneous vaginal delivery. She was transferred to postpartum unit without complications during her stay. Upon discharge she is voiding, tolerating PO, ambulating, and pain is controlled.

## 2018-12-19 NOTE — CHART NOTE - NSCHARTNOTEFT_GEN_A_CORE
s: pt extremely uncomfortable in spite of epidural    O sve: 4/90/-2  fhr cat one  ctx q 3      a/p    Care discussed with anesthesia attending; they will attempt higher dose to increase patient comfort

## 2018-12-19 NOTE — DISCHARGE NOTE OB - MATERIALS PROVIDED
Shaken Baby Prevention Handout/Breastfeeding Mother’s Support Group Information/Guide to Postpartum Care/[facility] [place] Screening Program/[facility] Hearing Screen Program/Tdap Vaccination (VIS Pub Date: 2012)/Breastfeeding Log/Back To Sleep Handout/Breastfeeding Guide and Packet/Birth Certificate Instructions

## 2018-12-19 NOTE — CHART NOTE - NSCHARTNOTEFT_GEN_A_CORE
Pt resting comfortably    FHT: cat I tracing  [place]: no contractions on monitor    continue with cytotec induction, will continue to monitor patient

## 2018-12-19 NOTE — CHART NOTE - NSCHARTNOTEFT_GEN_A_CORE
Vital Signs Last 24 Hrs  T(C): 36.8 (19 Dec 2018 08:00), Max: 36.8 (19 Dec 2018 02:13)  T(F): 98.24 (19 Dec 2018 08:00), Max: 98.24 (19 Dec 2018 02:13)  HR: 89 (19 Dec 2018 11:01) (81 - 118)  BP: 118/83 (19 Dec 2018 11:01) (105/61 - 165/110)  BP(mean): --  RR: 16 (19 Dec 2018 06:04) (16 - 18)  SpO2: 98% (19 Dec 2018 10:31) (98% - 100%)    FETAL HEART RATE   Baseline:  Variability: [ ] absent [ ] minimal [ X] moderate [ ] marked  Accelerations: [ X] present 15x15 or higher [ ] present 10x10 only  [ ] absent     DECELERATIONS:  [X ] Absent  [ ] Early  [ ] Variable                 [ ] Late present:  [ ] </= 2 decelerations in 30 min  [ ] more than 2 decelerations in 30 min  [ ] Prolonged: [ ] present [ ] absent    UTERINE CONTRACTIONS:  [ X] present      [ ] absent  Frequency     [ X] 3 or more in 10 minutes        [ ] less than 3 in 10 minutes  Intensity        [ ] <50mmHg          [ ] >50mmHg  Baseline uterine pressure:    CERVICAL EXAM: status post 3 doses of 20mcg and 2 doses of 40mcg of cytotec  Dilation: 1  Effacement: 50  Station: -3  cervical serrato placed    PAIN SCALE (0-10):    IMPRESSION:   [ ] Normal labor course   [ ] Protracted/ Arrest in active phase   [ ] Protracted/ Arrest in second stage  [ X] FHR Category I  [ ] FHR Category II [ ] FHR Category III  Additional:    INTERVENTIONS:  [ ] Start Pitocin   [ ] Increase Pitocin    [ ] Decrease Pitocin   [ ] Discontinue Pitocin  Cervical Ripening:  [ ] Start Cervidil     [ ] Stop Cervidil    [ ] Start Cytotec    [ ] Stop Cytotec  Additional:

## 2018-12-19 NOTE — OB RN DELIVERY SUMMARY - BABY A: WEIGHT IN POUNDS (FROM GRAMS), DELIVERY
[facility],   Phone: (   )    -  Fax: (   )    -    [facility]), Cardiovascular Disease  [address] [place] [address]  Phone: [phone]  Fax: [phone]
7

## 2018-12-19 NOTE — DISCHARGE NOTE OB - CARE PROVIDER_API CALL
Faviola Caldwell (DO), Obstetrics and Gynecology  [address] [place], [place] [address]  Phone: [phone]  Fax: [phone]

## 2018-12-19 NOTE — CHART NOTE - NSCHARTNOTEFT_GEN_A_CORE
s: pt slightly uncomfortable    sve:2/80/-2  fhr cat one  ctx q 4-5 on po cytotec    arom'ed with clear fluid; will start pitocin

## 2018-12-19 NOTE — OB PROVIDER DELIVERY SUMMARY - NSPROVIDERDELIVERYNOTE_OBGYN_ALL_OB_FT
pt fd and pushing;  of vigorus male infant 9/9 apgars over intact perineum.  Placenta with 3 vc delivered spontaneously intact.  right periurethral tear repaired with 2-0 chromic.  ebl 300 pt fd and pushing;  of vigorous male infant 9/9 apgars over intact perineum.  Placenta with 3 vc delivered spontaneously intact.  right periurethral tear repaired with 2-0 chromic.  ebl 300

## 2018-12-19 NOTE — DISCHARGE NOTE OB - PATIENT PORTAL LINK FT
You can access the [facility], offered by [facility], by registering with the following website: [email or web address]

## 2018-12-20 LAB
BASOPHILS # BLD AUTO: 0 K/UL — SIGNIFICANT CHANGE UP (ref 0–0.2)
BASOPHILS NFR BLD AUTO: 0.1 % — SIGNIFICANT CHANGE UP (ref 0–2)
EOSINOPHIL # BLD AUTO: 0 K/UL — SIGNIFICANT CHANGE UP (ref 0–0.5)
EOSINOPHIL NFR BLD AUTO: 0.3 % — SIGNIFICANT CHANGE UP (ref 0–6)
GLUCOSE BLDC GLUCOMTR-MCNC: 84 MG/DL — SIGNIFICANT CHANGE UP (ref 70–99)
HCT VFR BLD CALC: 31.5 % — LOW (ref 37–47)
HGB BLD-MCNC: 10 G/DL — LOW (ref 12–16)
LYMPHOCYTES # BLD AUTO: 23.7 % — SIGNIFICANT CHANGE UP (ref 20–55)
LYMPHOCYTES # BLD AUTO: 3 K/UL — SIGNIFICANT CHANGE UP (ref 1–4.8)
MCHC RBC-ENTMCNC: 27.2 PG — SIGNIFICANT CHANGE UP (ref 27–31)
MCHC RBC-ENTMCNC: 31.7 G/DL — LOW (ref 32–36)
MCV RBC AUTO: 85.8 FL — SIGNIFICANT CHANGE UP (ref 81–99)
MONOCYTES # BLD AUTO: 1.4 K/UL — HIGH (ref 0–0.8)
MONOCYTES NFR BLD AUTO: 10.9 % — HIGH (ref 3–10)
NEUTROPHILS # BLD AUTO: 8.3 K/UL — HIGH (ref 1.8–8)
NEUTROPHILS NFR BLD AUTO: 64.8 % — SIGNIFICANT CHANGE UP (ref 37–73)
PLATELET # BLD AUTO: 159 K/UL — SIGNIFICANT CHANGE UP (ref 150–400)
RBC # BLD: 3.67 M/UL — LOW (ref 4.4–5.2)
RBC # FLD: 14.2 % — SIGNIFICANT CHANGE UP (ref 11–15.6)
WBC # BLD: 12.8 K/UL — HIGH (ref 4.8–10.8)
WBC # FLD AUTO: 12.8 K/UL — HIGH (ref 4.8–10.8)

## 2018-12-20 RX ADMIN — Medication 1 TABLET(S): at 12:55

## 2018-12-20 RX ADMIN — OXYCODONE AND ACETAMINOPHEN 2 TABLET(S): 5; 325 TABLET ORAL at 18:38

## 2018-12-20 RX ADMIN — Medication 600 MILLIGRAM(S): at 00:54

## 2018-12-20 RX ADMIN — OXYCODONE AND ACETAMINOPHEN 2 TABLET(S): 5; 325 TABLET ORAL at 12:54

## 2018-12-20 RX ADMIN — OXYCODONE AND ACETAMINOPHEN 2 TABLET(S): 5; 325 TABLET ORAL at 13:24

## 2018-12-20 RX ADMIN — Medication 600 MILLIGRAM(S): at 00:24

## 2018-12-20 RX ADMIN — Medication 600 MILLIGRAM(S): at 09:27

## 2018-12-20 RX ADMIN — OXYCODONE AND ACETAMINOPHEN 2 TABLET(S): 5; 325 TABLET ORAL at 19:08

## 2018-12-20 RX ADMIN — Medication 600 MILLIGRAM(S): at 08:57

## 2018-12-20 NOTE — PROGRESS NOTE ADULT - SUBJECTIVE AND OBJECTIVE BOX
INTERVAL HPI/OVERNIGHT EVENTS:  28y Female s/p labor epidural on 12/19/18    Vital Signs Last 24 Hrs  T(C): 36.7 (20 Dec 2018 08:13), Max: 36.7 (20 Dec 2018 08:13)  T(F): 98 (20 Dec 2018 08:13), Max: 98 (20 Dec 2018 08:13)  HR: 108 (20 Dec 2018 08:13) (98 - 108)  BP: 102/73 (20 Dec 2018 08:13) (102/73 - 135/81)  BP(mean): --  RR: 18 (20 Dec 2018 08:13) (18 - 18)  SpO2: --    Patient seen, doing well, no headache, no residual numbness or weakness, no anesthetic complications or complaints noted or reported.

## 2018-12-20 NOTE — PROGRESS NOTE ADULT - ASSESSMENT
28y  s/p  at 39.3wks gestation PPD#1.   Encourage ambulation & hydration  Encourage mother/baby interaction  Continue pain control and monitor vaginal bleeding.   Plan for discharge as per normal criteria.

## 2018-12-20 NOTE — PROGRESS NOTE ADULT - SUBJECTIVE AND OBJECTIVE BOX
28y  s/p  at 39.3wks gestation PPD#1.   Patient seen and examined at bedside, no acute overnight events.   Patient is ambulating, +eating, +PO hydration, +voiding, +Flatus, -BM, +Formula feeding, +Breast feeding and pain is well controlled. Patient complaining of increased bleeding and passing large clots.   Denies headache, SOB, fever, chills and calf pain.    VS:   Vital Signs Last 24 Hrs  T(C): 36.6 (19 Dec 2018 19:45), Max: 36.8 (19 Dec 2018 07:25)  T(F): 97.8 (19 Dec 2018 19:45), Max: 98.24 (19 Dec 2018 08:00)  HR: 103 (19 Dec 2018 21:22) (81 - 118)  BP: 126/80 (19 Dec 2018 21:22) (102/51 - 165/110)  BP(mean): --  RR: 18 (19 Dec 2018 19:45) (18 - 18)  SpO2: 94% (19 Dec 2018 15:22) (94% - 100%)    Physical Exam:  General: NAD  Breast: No tenderness or abnormal discharge.  Abdomen: +BS, soft, ND, minimally tender, Fundus firm at level of umbilicus.   Pelvic: Minimal lochia  Ext: No cyanosis, edema or calf tenderness.     Labs:                        11.7   9.5   )-----------( 189      ( 18 Dec 2018 20:21 )             36.3     Urinalysis Basic - ( 18 Dec 2018 20:21 )    Color: Yellow / Appearance: Clear / S.020 / pH: x  Gluc: x / Ketone: Small  / Bili: Small / Urobili: 4 mg/dL   Blood: x / Protein: 100 mg/dL / Nitrite: Negative   Leuk Esterase: Small / RBC: 0-2 /HPF / WBC 3-5   Sq Epi: x / Non Sq Epi: Many / Bacteria: x        Medication:  MEDICATIONS  (STANDING):  diphtheria/tetanus/pertussis (acellular) Vaccine (ADAcel) 0.5 milliLiter(s) IntraMuscular once  methyldopa 500 milliGRAM(s) Oral every 12 hours  oxytocin Infusion 41.667 milliUNIT(s)/Min (125 mL/Hr) IV Continuous <Continuous>  oxytocin Infusion 2 milliUNIT(s)/Min (2 mL/Hr) IV Continuous <Continuous>  oxytocin Infusion 333.333 milliUNIT(s)/Min (1000 mL/Hr) IV Continuous <Continuous>  prenatal multivitamin 1 Tablet(s) Oral daily  sodium chloride 0.9% lock flush 3 milliLiter(s) IV Push every 8 hours    MEDICATIONS  (PRN):  acetaminophen   Tablet .. 650 milliGRAM(s) Oral every 6 hours PRN Temp greater or equal to 38.5C (101.3F), Mild Pain (1 - 3)  dibucaine 1% Ointment 1 Application(s) Topical every 4 hours PRN Perineal Discomfort  diphenhydrAMINE 25 milliGRAM(s) Oral every 6 hours PRN Itching  docusate sodium 100 milliGRAM(s) Oral two times a day PRN Stool Softening  glycerin Suppository - Adult 1 Suppository(s) Rectal at bedtime PRN Constipation  hydrocortisone 1% Cream 1 Application(s) Topical every 4 hours PRN Moderate to Severe Perineal Pain  ibuprofen  Tablet. 600 milliGRAM(s) Oral every 6 hours PRN Moderate Pain (4 - 6)  lanolin Ointment 1 Application(s) Topical every 6 hours PRN Sore Nipples  magnesium hydroxide Suspension 30 milliLiter(s) Oral two times a day PRN Constipation  oxyCODONE    5 mG/acetaminophen 325 mG 2 Tablet(s) Oral every 6 hours PRN Severe Pain (7 - 10)  pramoxine 1%/zinc 5% Cream 1 Application(s) Topical every 4 hours PRN Moderate to Severe Perineal Pain  simethicone 80 milliGRAM(s) Chew every 6 hours PRN Gas  witch hazel Pads 1 Application(s) Topical every 4 hours PRN Perineal Discomfort

## 2018-12-21 VITALS
SYSTOLIC BLOOD PRESSURE: 128 MMHG | HEART RATE: 104 BPM | DIASTOLIC BLOOD PRESSURE: 80 MMHG | RESPIRATION RATE: 20 BRPM | TEMPERATURE: 99 F

## 2018-12-21 RX ORDER — METHYLDOPA 250 MG
1 TABLET ORAL
Qty: 0 | Refills: 0 | COMMUNITY

## 2018-12-21 RX ORDER — IBUPROFEN 200 MG
1 TABLET ORAL
Qty: 28 | Refills: 0
Start: 2018-12-21 | End: 2018-12-27

## 2018-12-21 RX ORDER — HUMAN INSULIN 100 [IU]/ML
22 INJECTION, SUSPENSION SUBCUTANEOUS
Qty: 0 | Refills: 0 | COMMUNITY

## 2018-12-21 RX ORDER — HUMAN INSULIN 100 [IU]/ML
18 INJECTION, SUSPENSION SUBCUTANEOUS
Qty: 0 | Refills: 0 | COMMUNITY

## 2018-12-21 RX ADMIN — Medication 600 MILLIGRAM(S): at 01:00

## 2018-12-21 RX ADMIN — Medication 650 MILLIGRAM(S): at 13:45

## 2018-12-21 RX ADMIN — Medication 650 MILLIGRAM(S): at 12:54

## 2018-12-21 RX ADMIN — Medication 600 MILLIGRAM(S): at 00:05

## 2018-12-21 RX ADMIN — Medication 600 MILLIGRAM(S): at 08:40

## 2018-12-21 RX ADMIN — Medication 600 MILLIGRAM(S): at 07:47

## 2018-12-21 RX ADMIN — Medication 1 TABLET(S): at 12:58

## 2018-12-21 NOTE — PROGRESS NOTE ADULT - SUBJECTIVE AND OBJECTIVE BOX
28y year old  PPP#3 s/p  at 39wks gestation.     No acute overnight events. Pain well controlled.   Patient is ambulating, +voiding, +flatus, +BM  Reports minimal lochia.   +breast feeding, -breast tenderness    VS:   Vital Signs Last 24 Hrs  T(C): 36.8 (20 Dec 2018 19:59), Max: 36.8 (20 Dec 2018 19:59)  T(F): 98.2 (20 Dec 2018 19:59), Max: 98.2 (20 Dec 2018 19:59)  HR: 103 (20 Dec 2018 19:59) (103 - 108)  BP: 124/74 (20 Dec 2018 19:59) (102/73 - 124/74)  RR: 18 (20 Dec 2018 19:59) (18 - 18)    Physical Exam:  General: NAD  Abdomen: soft, ND, firm fundus palpated below the umbilicus.   Ext: nontender lower extremity pain bilaterally.    Labs:                        10.0   12.8  )-----------( 159      ( 20 Dec 2018 06:06 )             31.5

## 2018-12-26 PROBLEM — O03.9 COMPLETE OR UNSPECIFIED SPONTANEOUS ABORTION WITHOUT COMPLICATION: Chronic | Status: ACTIVE | Noted: 2018-12-18

## 2018-12-27 DIAGNOSIS — O26.03 EXCESSIVE WEIGHT GAIN IN PREGNANCY, THIRD TRIMESTER: ICD-10-CM

## 2018-12-27 DIAGNOSIS — O24.414 GESTATIONAL DIABETES MELLITUS IN PREGNANCY, INSULIN CONTROLLED: ICD-10-CM

## 2018-12-31 PROBLEM — O24.414 GESTATIONAL DIABETES REQUIRING INSULIN: Status: RESOLVED | Noted: 2018-11-07 | Resolved: 2018-12-31

## 2018-12-31 PROBLEM — O26.03 EXCESSIVE WEIGHT GAIN DURING PREGNANCY IN THIRD TRIMESTER: Status: RESOLVED | Noted: 2018-11-21 | Resolved: 2018-12-31

## 2019-01-09 PROCEDURE — 86850 RBC ANTIBODY SCREEN: CPT

## 2019-01-09 PROCEDURE — 80053 COMPREHEN METABOLIC PANEL: CPT

## 2019-01-09 PROCEDURE — 82962 GLUCOSE BLOOD TEST: CPT

## 2019-01-09 PROCEDURE — 81001 URINALYSIS AUTO W/SCOPE: CPT

## 2019-01-09 PROCEDURE — 36415 COLL VENOUS BLD VENIPUNCTURE: CPT

## 2019-01-09 PROCEDURE — 85027 COMPLETE CBC AUTOMATED: CPT

## 2019-01-09 PROCEDURE — 85610 PROTHROMBIN TIME: CPT

## 2019-01-09 PROCEDURE — 86901 BLOOD TYPING SEROLOGIC RH(D): CPT

## 2019-01-09 PROCEDURE — 85730 THROMBOPLASTIN TIME PARTIAL: CPT

## 2019-01-09 PROCEDURE — 86900 BLOOD TYPING SEROLOGIC ABO: CPT

## 2019-01-09 PROCEDURE — 85384 FIBRINOGEN ACTIVITY: CPT

## 2019-01-09 PROCEDURE — 83615 LACTATE (LD) (LDH) ENZYME: CPT

## 2019-01-09 PROCEDURE — 86780 TREPONEMA PALLIDUM: CPT

## 2019-01-09 PROCEDURE — 84550 ASSAY OF BLOOD/URIC ACID: CPT

## 2019-01-28 ENCOUNTER — APPOINTMENT (OUTPATIENT)
Dept: OBGYN | Facility: CLINIC | Age: 29
End: 2019-01-28

## 2019-02-01 ENCOUNTER — APPOINTMENT (OUTPATIENT)
Dept: OBGYN | Facility: CLINIC | Age: 29
End: 2019-02-01
Payer: MEDICAID

## 2019-02-01 VITALS
DIASTOLIC BLOOD PRESSURE: 90 MMHG | WEIGHT: 293 LBS | BODY MASS INDEX: 42.42 KG/M2 | HEIGHT: 69.5 IN | SYSTOLIC BLOOD PRESSURE: 140 MMHG

## 2019-02-01 PROCEDURE — 0503F POSTPARTUM CARE VISIT: CPT

## 2019-02-01 NOTE — HISTORY OF PRESENT ILLNESS
[Delivery Date: ___] : on [unfilled] [] : delivered by vaginal delivery [Male] : Delivery History: baby boy [None] : The patient is currently asymptomatic [Back to Normal] : is back to normal in size [Cervix Sample Taken] : cervical sample taken for a Pap smear [Doing Well] : is doing well [de-identified] : history of chronic htn; was on aldomet; gdm also [de-identified] : stopping imminenetly [de-identified] : mildly elevated bp; asked her to see pcp for management; PCP names provided.  Recommended iud for birth control but pt desires ocp's; asked to control bp prior to commencing.  Hemoglobin a1c sent to screen for dm

## 2019-02-06 LAB — CYTOLOGY CVX/VAG DOC THIN PREP: NORMAL

## 2019-03-28 ENCOUNTER — APPOINTMENT (OUTPATIENT)
Dept: OBGYN | Facility: CLINIC | Age: 29
End: 2019-03-28
Payer: MEDICAID

## 2019-03-28 ENCOUNTER — RESULT CHARGE (OUTPATIENT)
Age: 29
End: 2019-03-28

## 2019-03-28 ENCOUNTER — TRANSCRIPTION ENCOUNTER (OUTPATIENT)
Age: 29
End: 2019-03-28

## 2019-03-28 VITALS
WEIGHT: 293 LBS | DIASTOLIC BLOOD PRESSURE: 90 MMHG | SYSTOLIC BLOOD PRESSURE: 136 MMHG | BODY MASS INDEX: 43.4 KG/M2 | HEIGHT: 69 IN

## 2019-03-28 LAB
HCG UR QL: NEGATIVE
QUALITY CONTROL: YES

## 2019-03-28 PROCEDURE — 81025 URINE PREGNANCY TEST: CPT

## 2019-03-28 PROCEDURE — 58300 INSERT INTRAUTERINE DEVICE: CPT

## 2019-03-28 RX ORDER — CONTAINER,EMPTY
EACH MISCELLANEOUS
Qty: 1 | Refills: 0 | Status: COMPLETED | COMMUNITY
Start: 2018-11-01 | End: 2019-03-28

## 2019-03-28 RX ORDER — BLOOD SUGAR DIAGNOSTIC
STRIP MISCELLANEOUS
Qty: 3 | Refills: 2 | Status: COMPLETED | COMMUNITY
Start: 2018-10-18 | End: 2019-03-28

## 2019-03-28 RX ORDER — INSULIN HUMAN 100 [IU]/ML
100 INJECTION, SUSPENSION SUBCUTANEOUS
Qty: 1 | Refills: 0 | Status: DISCONTINUED | COMMUNITY
Start: 2018-11-01 | End: 2019-03-28

## 2019-03-28 RX ORDER — URINE ACETONE TEST STRIPS
STRIP MISCELLANEOUS
Qty: 1 | Refills: 0 | Status: COMPLETED | COMMUNITY
Start: 2018-10-04 | End: 2019-03-28

## 2019-03-28 RX ORDER — BLOOD-GLUCOSE METER
W/DEVICE EACH MISCELLANEOUS
Qty: 1 | Refills: 0 | Status: COMPLETED | COMMUNITY
Start: 2018-10-18 | End: 2019-03-28

## 2019-03-28 RX ORDER — METHYLDOPA 500 MG/1
500 TABLET, FILM COATED ORAL
Qty: 135 | Refills: 0 | Status: COMPLETED | COMMUNITY
Start: 2018-08-21 | End: 2019-03-28

## 2019-03-28 RX ORDER — ISOPROPYL ALCOHOL 70 ML/100ML
SWAB TOPICAL
Qty: 1 | Refills: 1 | Status: COMPLETED | COMMUNITY
Start: 2018-10-04 | End: 2019-03-28

## 2019-03-28 RX ORDER — MICONAZOLE NITRATE 2 %
30-10-1-200 CREAM (GRAM) TOPICAL
Qty: 90 | Refills: 3 | Status: COMPLETED | COMMUNITY
Start: 2018-04-26 | End: 2019-03-28

## 2019-03-28 RX ORDER — SYRINGE-NEEDLE,INSULIN,0.5 ML 31 GX5/16"
31G X 5/16" SYRINGE, EMPTY DISPOSABLE MISCELLANEOUS
Qty: 1 | Refills: 1 | Status: COMPLETED | COMMUNITY
Start: 2018-11-01 | End: 2019-03-28

## 2019-03-28 RX ORDER — LANCETS 30 GAUGE
EACH MISCELLANEOUS
Qty: 2 | Refills: 2 | Status: COMPLETED | COMMUNITY
Start: 2018-10-18 | End: 2019-03-28

## 2019-03-28 RX ORDER — CONTAINER,EMPTY
EACH MISCELLANEOUS
Qty: 1 | Refills: 0 | Status: COMPLETED | COMMUNITY
Start: 2018-10-04 | End: 2019-03-28

## 2019-03-28 NOTE — PROCEDURE
[IUD Placement] : intrauterine device (IUD) placement [Prevention of Pregnancy] : prevention of pregnancy [Risks] : risks [Benefits] : benefits [Alternatives] : alternatives [Patient] : patient [CONSENT OBTAINED] : written consent was obtained prior to the procedure. [LMP ___] : LMP was [unfilled] [Ibuprofen ___ mg] : ibuprofen [unfilled] ~Umg [Uterus Sounded to ___cm] : sounded to [unfilled]Ucm [Tenaculum] : a single toothed tenaculum [Easy Passage] : allowed easy passage of a uterine sound without dilation [Mirena IUD] : The Mirena IUD was inserted past the internal cervical os. The IUD was then gently inserted upwards toward the fundus.  The IUD strings were cut to an appropriate length. [Tolerated Well] : the patient tolerated the procedure well [No Complications] : there were no complications [None] : no post-procedure medications given

## 2019-04-23 ENCOUNTER — APPOINTMENT (OUTPATIENT)
Dept: OBGYN | Facility: CLINIC | Age: 29
End: 2019-04-23
Payer: MEDICAID

## 2019-04-23 VITALS
HEIGHT: 69 IN | SYSTOLIC BLOOD PRESSURE: 136 MMHG | BODY MASS INDEX: 43.4 KG/M2 | DIASTOLIC BLOOD PRESSURE: 92 MMHG | WEIGHT: 293 LBS

## 2019-04-23 PROCEDURE — 99214 OFFICE O/P EST MOD 30 MIN: CPT

## 2019-04-23 PROCEDURE — 76830 TRANSVAGINAL US NON-OB: CPT

## 2019-04-23 NOTE — PHYSICAL EXAM
[Normal] : urethra [Labia Majora] : labia major [Scant] : there was scant vaginal bleeding [IUD String] : had an IUD string protruding out

## 2019-05-07 ENCOUNTER — APPOINTMENT (OUTPATIENT)
Dept: OBGYN | Facility: CLINIC | Age: 29
End: 2019-05-07
Payer: MEDICAID

## 2019-05-07 VITALS
HEIGHT: 69 IN | BODY MASS INDEX: 43.4 KG/M2 | HEART RATE: 92 BPM | SYSTOLIC BLOOD PRESSURE: 126 MMHG | WEIGHT: 293 LBS | DIASTOLIC BLOOD PRESSURE: 86 MMHG

## 2019-05-07 PROCEDURE — 58301 REMOVE INTRAUTERINE DEVICE: CPT

## 2019-05-07 PROCEDURE — 99214 OFFICE O/P EST MOD 30 MIN: CPT | Mod: 25

## 2019-05-07 NOTE — PROCEDURE
[Mirena] : Mirena [Patient] : patient [Risks] : risks [Strings Visualized] : the IUD strings were visualized [IUD Discarded] : discarded [Tolerated Well] : the patient tolerated the procedure well [No Complications] : none [de-identified] : malposition

## 2019-08-02 ENCOUNTER — TRANSCRIPTION ENCOUNTER (OUTPATIENT)
Age: 29
End: 2019-08-02

## 2019-08-06 ENCOUNTER — APPOINTMENT (OUTPATIENT)
Dept: OBGYN | Facility: CLINIC | Age: 29
End: 2019-08-06

## 2019-11-12 ENCOUNTER — APPOINTMENT (OUTPATIENT)
Dept: OBGYN | Facility: CLINIC | Age: 29
End: 2019-11-12

## 2020-03-26 ENCOUNTER — APPOINTMENT (OUTPATIENT)
Dept: OBGYN | Facility: CLINIC | Age: 30
End: 2020-03-26
Payer: COMMERCIAL

## 2020-03-26 VITALS
BODY MASS INDEX: 38.21 KG/M2 | HEIGHT: 69 IN | SYSTOLIC BLOOD PRESSURE: 133 MMHG | DIASTOLIC BLOOD PRESSURE: 85 MMHG | WEIGHT: 258 LBS | HEART RATE: 91 BPM

## 2020-03-26 PROCEDURE — 99395 PREV VISIT EST AGE 18-39: CPT

## 2020-03-31 DIAGNOSIS — B96.89 ACUTE VAGINITIS: ICD-10-CM

## 2020-03-31 DIAGNOSIS — N76.0 ACUTE VAGINITIS: ICD-10-CM

## 2020-03-31 LAB — CYTOLOGY CVX/VAG DOC THIN PREP: ABNORMAL

## 2020-12-23 PROBLEM — N76.0 BACTERIAL VAGINOSIS: Status: RESOLVED | Noted: 2017-03-21 | Resolved: 2020-12-23

## 2021-01-08 NOTE — OB PROVIDER H&P - NS_PROVCHECK_OBGYN_ALL_OB
Pt remained free of falls. Call light within reach. A/ox4, calls out appropriately. Non skid footwear in place. Bed locked and in lowest position. Patient was informed of the reason for this intervention.

## 2021-02-04 ENCOUNTER — APPOINTMENT (OUTPATIENT)
Dept: OBGYN | Facility: CLINIC | Age: 31
End: 2021-02-04
Payer: COMMERCIAL

## 2021-02-04 VITALS
SYSTOLIC BLOOD PRESSURE: 138 MMHG | DIASTOLIC BLOOD PRESSURE: 86 MMHG | BODY MASS INDEX: 38.8 KG/M2 | HEIGHT: 69 IN | WEIGHT: 262 LBS

## 2021-02-04 PROCEDURE — 99072 ADDL SUPL MATRL&STAF TM PHE: CPT

## 2021-02-04 PROCEDURE — 99213 OFFICE O/P EST LOW 20 MIN: CPT

## 2021-02-04 NOTE — HISTORY OF PRESENT ILLNESS
[FreeTextEntry1] : 30-year-old  woman para one here for follow up visit to discuss birth control. Patient has a history of chronic hypertension and has started to see a primary doctor was considering medication but she has not started yet. She is attempting to lose some weight. She is mildly hypertensive. She is currently on Junel 120. Patient has previously tried a Mirena IUD in not been satisfied with the; she had it removed secondary to abnormal bleeding.\par \par I discussed the issues at hand at length with the patient. I did  her that she is not a candidate for oral contraceptives given that she is hypertensive. Secondly I stressed the patient really needs long acting contraception as she is only 30. I discussed different IUDs with her again including ParaGard Mirena etc. Patient wishes to try Mirena again. She will contact her insurance company in followup. For now she is going to continue on oral contraceptives with the understanding that she is going to followup with her PCP shortly for blood pressure control.

## 2021-06-16 ENCOUNTER — TRANSCRIPTION ENCOUNTER (OUTPATIENT)
Age: 31
End: 2021-06-16

## 2021-06-17 ENCOUNTER — TRANSCRIPTION ENCOUNTER (OUTPATIENT)
Age: 31
End: 2021-06-17

## 2022-02-16 ENCOUNTER — ASOB RESULT (OUTPATIENT)
Age: 32
End: 2022-02-16

## 2022-02-16 ENCOUNTER — APPOINTMENT (OUTPATIENT)
Dept: OBGYN | Facility: CLINIC | Age: 32
End: 2022-02-16
Payer: COMMERCIAL

## 2022-02-16 ENCOUNTER — RESULT CHARGE (OUTPATIENT)
Age: 32
End: 2022-02-16

## 2022-02-16 ENCOUNTER — APPOINTMENT (OUTPATIENT)
Dept: ANTEPARTUM | Facility: CLINIC | Age: 32
End: 2022-02-16
Payer: COMMERCIAL

## 2022-02-16 VITALS
HEIGHT: 69 IN | BODY MASS INDEX: 36.43 KG/M2 | WEIGHT: 246 LBS | DIASTOLIC BLOOD PRESSURE: 80 MMHG | SYSTOLIC BLOOD PRESSURE: 130 MMHG

## 2022-02-16 VITALS — TEMPERATURE: 98.8 F

## 2022-02-16 LAB
HCG UR QL: NEGATIVE
QUALITY CONTROL: YES

## 2022-02-16 PROCEDURE — 99213 OFFICE O/P EST LOW 20 MIN: CPT

## 2022-02-16 PROCEDURE — 76830 TRANSVAGINAL US NON-OB: CPT

## 2022-02-16 PROCEDURE — 76856 US EXAM PELVIC COMPLETE: CPT | Mod: 59

## 2022-02-16 NOTE — HISTORY OF PRESENT ILLNESS
[FreeTextEntry1] : -31year-old  woman para one presents with complaint of significant pelvic cramping as she developed during intercourse. She is having it intermittently. She denies any vaginal discharge fevers or chills. She is sexually active and monogamous and not using birth control. She denies any associated GI or  symptoms.\par \par Pelvic sonogram to perform by the technician reveals 8.0 x 5.3 cm uterus with normal appearing endometrium and a 2.6 cm right-sided corpus luteal cyst. Normal left adnexa.

## 2022-02-16 NOTE — PHYSICAL EXAM
[Labia Majora] : normal [Labia Minora] : normal [Normal] : normal [Tenderness] : tender [Adnexa Tenderness] : tender

## 2022-02-16 NOTE — DISCUSSION/SUMMARY
[FreeTextEntry1] : UCG done today is negative. Urinalysis is negative as well. I discussed the clinical picture at length with the patient. She appears to have some cervical motion tenderness and uterine tenderness as well as bilateral adnexal tenderness. I discussed possibly of PID although patient is afebrile she is quite uncomfortable and tender. She declines to go to the emergency room for CBC and further evaluation. Pelvic sonogram revealed a hemorrhagic right-sided cyst and it is possible summer her symptoms are associated with this however I am concerned about her tenderness. Patient declines antibiotics for PID. Yeast bacterial GC and Chlamydia cultures are performed. Patient will monitor her symptoms and advise me. She will return in 2 weeks for reevaluation.

## 2022-02-17 LAB
C TRACH RRNA SPEC QL NAA+PROBE: NOT DETECTED
HPV HIGH+LOW RISK DNA PNL CVX: NOT DETECTED
N GONORRHOEA RRNA SPEC QL NAA+PROBE: NOT DETECTED
SOURCE TP AMPLIFICATION: NORMAL

## 2022-03-04 ENCOUNTER — TRANSCRIPTION ENCOUNTER (OUTPATIENT)
Age: 32
End: 2022-03-04

## 2022-03-04 LAB
A VAGINAE DNA VAG QL NAA+PROBE: ABNORMAL
BACTERIA UR CULT: NORMAL
BVAB2 DNA VAG QL NAA+PROBE: ABNORMAL
C KRUSEI DNA VAG QL NAA+PROBE: NEGATIVE
C TRACH RRNA SPEC QL NAA+PROBE: NEGATIVE
CYTOLOGY CVX/VAG DOC THIN PREP: ABNORMAL
MEGA1 DNA VAG QL NAA+PROBE: ABNORMAL
N GONORRHOEA RRNA SPEC QL NAA+PROBE: NEGATIVE
T VAGINALIS RRNA SPEC QL NAA+PROBE: NEGATIVE

## 2022-03-07 ENCOUNTER — APPOINTMENT (OUTPATIENT)
Dept: OBGYN | Facility: CLINIC | Age: 32
End: 2022-03-07

## 2022-04-12 ENCOUNTER — NON-APPOINTMENT (OUTPATIENT)
Age: 32
End: 2022-04-12

## 2022-04-13 ENCOUNTER — TRANSCRIPTION ENCOUNTER (OUTPATIENT)
Age: 32
End: 2022-04-13

## 2022-04-20 ENCOUNTER — ASOB RESULT (OUTPATIENT)
Age: 32
End: 2022-04-20

## 2022-04-20 ENCOUNTER — APPOINTMENT (OUTPATIENT)
Dept: ANTEPARTUM | Facility: CLINIC | Age: 32
End: 2022-04-20
Payer: COMMERCIAL

## 2022-04-20 ENCOUNTER — APPOINTMENT (OUTPATIENT)
Dept: OBGYN | Facility: CLINIC | Age: 32
End: 2022-04-20
Payer: COMMERCIAL

## 2022-04-20 VITALS
DIASTOLIC BLOOD PRESSURE: 80 MMHG | WEIGHT: 246 LBS | SYSTOLIC BLOOD PRESSURE: 125 MMHG | HEIGHT: 69 IN | BODY MASS INDEX: 36.43 KG/M2

## 2022-04-20 DIAGNOSIS — Z30.430 ENCOUNTER FOR INSERTION OF INTRAUTERINE CONTRACEPTIVE DEVICE: ICD-10-CM

## 2022-04-20 DIAGNOSIS — O21.9 VOMITING OF PREGNANCY, UNSPECIFIED: ICD-10-CM

## 2022-04-20 DIAGNOSIS — Z30.9 ENCOUNTER FOR CONTRACEPTIVE MANAGEMENT, UNSPECIFIED: ICD-10-CM

## 2022-04-20 DIAGNOSIS — Z30.432 ENCOUNTER FOR REMOVAL OF INTRAUTERINE CONTRACEPTIVE DEVICE: ICD-10-CM

## 2022-04-20 DIAGNOSIS — Z87.898 PERSONAL HISTORY OF OTHER SPECIFIED CONDITIONS: ICD-10-CM

## 2022-04-20 DIAGNOSIS — Z97.5 PRESENCE OF (INTRAUTERINE) CONTRACEPTIVE DEVICE: ICD-10-CM

## 2022-04-20 DIAGNOSIS — T83.32XA DISPLACEMENT OF INTRAUTERINE CONTRACEPTIVE DEVICE, INITIAL ENCOUNTER: ICD-10-CM

## 2022-04-20 DIAGNOSIS — R10.2 PELVIC AND PERINEAL PAIN: ICD-10-CM

## 2022-04-20 DIAGNOSIS — Z30.431 ENCOUNTER FOR ROUTINE CHECKING OF INTRAUTERINE CONTRACEPTIVE DEVICE: ICD-10-CM

## 2022-04-20 LAB — HCG UR QL: POSITIVE

## 2022-04-20 PROCEDURE — 99214 OFFICE O/P EST MOD 30 MIN: CPT

## 2022-04-20 PROCEDURE — 76801 OB US < 14 WKS SINGLE FETUS: CPT

## 2022-04-20 PROCEDURE — 81025 URINE PREGNANCY TEST: CPT

## 2022-04-20 RX ORDER — ONDANSETRON 4 MG/1
4 TABLET ORAL
Qty: 20 | Refills: 1 | Status: COMPLETED | COMMUNITY
Start: 2020-07-31 | End: 2022-04-20

## 2022-04-20 RX ORDER — TERCONAZOLE 4 MG/G
0.4 CREAM VAGINAL
Qty: 1 | Refills: 0 | Status: COMPLETED | COMMUNITY
Start: 2022-04-06 | End: 2022-04-20

## 2022-04-20 RX ORDER — NORETHINDRONE 0.35 MG/1
0.35 TABLET ORAL DAILY
Qty: 84 | Refills: 1 | Status: COMPLETED | COMMUNITY
Start: 2019-05-07 | End: 2022-04-20

## 2022-04-20 RX ORDER — NORETHINDRONE ACETATE AND ETHINYL ESTRADIOL AND FERROUS FUMARATE 1MG-20(21)
1-20 KIT ORAL
Qty: 84 | Refills: 0 | Status: COMPLETED | COMMUNITY
Start: 2020-03-26 | End: 2022-04-20

## 2022-04-20 RX ORDER — METRONIDAZOLE 7.5 MG/G
0.75 GEL VAGINAL
Qty: 1 | Refills: 1 | Status: COMPLETED | COMMUNITY
Start: 2020-03-31 | End: 2022-04-20

## 2022-04-21 NOTE — COUNSELING
[Nutrition/ Exercise/ Weight Management] : nutrition, exercise, weight management [Vitamins/Supplements] : vitamins/supplements [Pregnancy Options] : pregnancy options [Medication Management] : medication management [Other ___] : [unfilled]

## 2022-04-21 NOTE — HISTORY OF PRESENT ILLNESS
[FreeTextEntry1] : 31-year-old female -0-1-1 presenting office for confirmation of pregnancy.  Patient has not had an ultrasound to this date to confirm a viable or a intrauterine pregnancy.  Her LMP is 2022 placing her at 8 weeks 2 days gestation with an EDC of 2022.  Obstetric history of 1 prior vaginal delivery complicated by gestational diabetes type A2, on insulin, and gestational hypertension, she also have a dilation and curettage secondary to an elective termination of pregnancy several years prior.  Denies gynecologic history.  Denies medical history.  Surgical history of tonsils and adenoids.  She has a social history significant for smoking cigarettes and marijuana, which she has discontinued since finding out she was pregnant.  Denies allergies to medications.

## 2022-04-21 NOTE — PLAN
[FreeTextEntry1] : \par Transvaginal ultrasound consistent with reported dating via LMP and EDC will remain 11/28/2022.  Patient will return to office in approximately 2 weeks time for her initial prenatal appointment.  Patient was counseled regarding her history of gestational diabetes type A2, and history of gestational hypertension.  She will be referred to maternal-fetal medicine secondary to these high risk potential complications.  She was given call, follow-up, ER precautions and all questions were addressed.  She understands plan of care.

## 2022-05-15 ENCOUNTER — NON-APPOINTMENT (OUTPATIENT)
Age: 32
End: 2022-05-15

## 2022-05-16 ENCOUNTER — NON-APPOINTMENT (OUTPATIENT)
Age: 32
End: 2022-05-16

## 2022-05-16 ENCOUNTER — APPOINTMENT (OUTPATIENT)
Dept: OBGYN | Facility: CLINIC | Age: 32
End: 2022-05-16
Payer: COMMERCIAL

## 2022-05-16 VITALS
SYSTOLIC BLOOD PRESSURE: 120 MMHG | WEIGHT: 246 LBS | BODY MASS INDEX: 36.43 KG/M2 | DIASTOLIC BLOOD PRESSURE: 80 MMHG | HEIGHT: 69 IN

## 2022-05-16 DIAGNOSIS — Z78.9 OTHER SPECIFIED HEALTH STATUS: ICD-10-CM

## 2022-05-16 DIAGNOSIS — F17.210 NICOTINE DEPENDENCE, CIGARETTES, UNCOMPLICATED: ICD-10-CM

## 2022-05-16 PROCEDURE — 0501F PRENATAL FLOW SHEET: CPT

## 2022-05-18 ENCOUNTER — NON-APPOINTMENT (OUTPATIENT)
Age: 32
End: 2022-05-18

## 2022-05-18 ENCOUNTER — ASOB RESULT (OUTPATIENT)
Age: 32
End: 2022-05-18

## 2022-05-18 ENCOUNTER — APPOINTMENT (OUTPATIENT)
Dept: ANTEPARTUM | Facility: CLINIC | Age: 32
End: 2022-05-18
Payer: COMMERCIAL

## 2022-05-18 DIAGNOSIS — O35.1XX0 MATERNAL CARE FOR (SUSPECTED) CHROMOSOMAL ABNORMALITY IN FETUS, NOT APPLICABLE OR UNSPECIFIED: ICD-10-CM

## 2022-05-18 DIAGNOSIS — Z34.90 ENCOUNTER FOR SUPERVISION OF NORMAL PREGNANCY, UNSPECIFIED, UNSPECIFIED TRIMESTER: ICD-10-CM

## 2022-05-18 PROCEDURE — 76813 OB US NUCHAL MEAS 1 GEST: CPT

## 2022-05-18 PROCEDURE — 36416 COLLJ CAPILLARY BLOOD SPEC: CPT

## 2022-05-18 PROCEDURE — ZZZZZ: CPT

## 2022-05-19 ENCOUNTER — LABORATORY RESULT (OUTPATIENT)
Age: 32
End: 2022-05-19

## 2022-05-23 ENCOUNTER — NON-APPOINTMENT (OUTPATIENT)
Age: 32
End: 2022-05-23

## 2022-05-23 LAB
1ST TRIMESTER DATA: NORMAL
ADDENDUM DOC: NORMAL
AFP PNL SERPL: NORMAL
AFP SERPL-ACNC: NORMAL
CLINICAL BIOCHEMIST REVIEW: NORMAL
FREE BETA HCG 1ST TRIMESTER: NORMAL
Lab: NORMAL
NASAL BONE: PRESENT
NOTES NTD: NORMAL
NT: NORMAL
PAPP-A SERPL-ACNC: NORMAL
TRISOMY 18/3: NORMAL

## 2022-05-26 LAB
CLARI ADDITIONAL INFO: NORMAL
CLARI CHROMOSOME 13: NORMAL
CLARI CHROMOSOME 18: NORMAL
CLARI CHROMOSOME 21: NORMAL
CLARI SEX CHROMOSOMES: NORMAL
CLARI TEST COMMENT: NORMAL
CLARITEST NIPT: NORMAL
FETAL FRACT: NORMAL
GESTATION AGE: NORMAL
MATERNAL WEIGHT (LBS):: NORMAL
PLEASE INCLUDE GENDER RESULTS ON THIS REPORT:: NORMAL
TYPE OF PREGNANCY:: NORMAL

## 2022-06-27 ENCOUNTER — APPOINTMENT (OUTPATIENT)
Dept: ANTEPARTUM | Facility: CLINIC | Age: 32
End: 2022-06-27

## 2022-06-27 ENCOUNTER — APPOINTMENT (OUTPATIENT)
Dept: OBGYN | Facility: CLINIC | Age: 32
End: 2022-06-27
Payer: COMMERCIAL

## 2022-06-27 VITALS
WEIGHT: 246 LBS | DIASTOLIC BLOOD PRESSURE: 80 MMHG | HEIGHT: 69 IN | SYSTOLIC BLOOD PRESSURE: 120 MMHG | BODY MASS INDEX: 36.43 KG/M2

## 2022-06-27 DIAGNOSIS — N91.1 SECONDARY AMENORRHEA: ICD-10-CM

## 2022-06-27 DIAGNOSIS — Z86.32 SUPERVISION OF PREGNANCY WITH OTHER POOR REPRODUCTIVE OR OBSTETRIC HISTORY, UNSPECIFIED TRIMESTER: ICD-10-CM

## 2022-06-27 DIAGNOSIS — O09.299 SUPERVISION OF PREGNANCY WITH OTHER POOR REPRODUCTIVE OR OBSTETRIC HISTORY, UNSPECIFIED TRIMESTER: ICD-10-CM

## 2022-06-27 DIAGNOSIS — Z32.01 ENCOUNTER FOR PREGNANCY TEST, RESULT POSITIVE: ICD-10-CM

## 2022-06-27 PROCEDURE — 0502F SUBSEQUENT PRENATAL CARE: CPT

## 2022-07-13 ENCOUNTER — APPOINTMENT (OUTPATIENT)
Dept: ANTEPARTUM | Facility: CLINIC | Age: 32
End: 2022-07-13

## 2022-07-13 ENCOUNTER — ASOB RESULT (OUTPATIENT)
Age: 32
End: 2022-07-13

## 2022-07-13 PROCEDURE — 76817 TRANSVAGINAL US OBSTETRIC: CPT

## 2022-07-13 PROCEDURE — 76811 OB US DETAILED SNGL FETUS: CPT

## 2022-07-26 NOTE — OB RN DELIVERY SUMMARY - NS_LABORONSET_OBGYN_ALL_OB_DT
Patient-Reported Vitals 7/26/2022   Patient-Reported Weight -   Patient-Reported Height -   Patient-Reported Systolic 722   Patient-Reported Diastolic 65   Patient-Reported Pulse -   Patient-Reported Temperature -
Patient-Reported Vitals 8/25/2020   Patient-Reported Weight 180   Patient-Reported Height 5'10\"   Patient-Reported Systolic 916   Patient-Reported Diastolic 80   Patient-Reported Pulse 84   Patient-Reported Temperature 98.6
19-Dec-2018 09:25

## 2022-08-02 ENCOUNTER — NON-APPOINTMENT (OUTPATIENT)
Age: 32
End: 2022-08-02

## 2022-08-03 ENCOUNTER — APPOINTMENT (OUTPATIENT)
Dept: OBGYN | Facility: CLINIC | Age: 32
End: 2022-08-03

## 2022-08-03 VITALS
BODY MASS INDEX: 38.95 KG/M2 | WEIGHT: 263 LBS | HEIGHT: 69 IN | SYSTOLIC BLOOD PRESSURE: 126 MMHG | DIASTOLIC BLOOD PRESSURE: 83 MMHG

## 2022-08-03 PROCEDURE — 0502F SUBSEQUENT PRENATAL CARE: CPT

## 2022-08-20 ENCOUNTER — OUTPATIENT (OUTPATIENT)
Dept: INPATIENT UNIT | Facility: HOSPITAL | Age: 32
LOS: 1 days | End: 2022-08-20
Payer: MEDICAID

## 2022-08-20 VITALS
SYSTOLIC BLOOD PRESSURE: 136 MMHG | HEART RATE: 99 BPM | RESPIRATION RATE: 18 BRPM | DIASTOLIC BLOOD PRESSURE: 62 MMHG | TEMPERATURE: 98 F

## 2022-08-20 VITALS — DIASTOLIC BLOOD PRESSURE: 64 MMHG | SYSTOLIC BLOOD PRESSURE: 124 MMHG | HEART RATE: 88 BPM

## 2022-08-20 DIAGNOSIS — O47.02 FALSE LABOR BEFORE 37 COMPLETED WEEKS OF GESTATION, SECOND TRIMESTER: ICD-10-CM

## 2022-08-20 DIAGNOSIS — Z90.89 ACQUIRED ABSENCE OF OTHER ORGANS: Chronic | ICD-10-CM

## 2022-08-20 LAB
ALBUMIN SERPL ELPH-MCNC: 3.3 G/DL — SIGNIFICANT CHANGE UP (ref 3.3–5.2)
ALP SERPL-CCNC: 42 U/L — SIGNIFICANT CHANGE UP (ref 40–120)
ALT FLD-CCNC: 10 U/L — SIGNIFICANT CHANGE UP
ANION GAP SERPL CALC-SCNC: 11 MMOL/L — SIGNIFICANT CHANGE UP (ref 5–17)
APPEARANCE UR: CLEAR — SIGNIFICANT CHANGE UP
AST SERPL-CCNC: 27 U/L — SIGNIFICANT CHANGE UP
BASOPHILS # BLD AUTO: 0.01 K/UL — SIGNIFICANT CHANGE UP (ref 0–0.2)
BASOPHILS NFR BLD AUTO: 0.1 % — SIGNIFICANT CHANGE UP (ref 0–2)
BILIRUB SERPL-MCNC: 0.4 MG/DL — SIGNIFICANT CHANGE UP (ref 0.4–2)
BILIRUB UR-MCNC: NEGATIVE — SIGNIFICANT CHANGE UP
BUN SERPL-MCNC: 6.9 MG/DL — LOW (ref 8–20)
CALCIUM SERPL-MCNC: 8.6 MG/DL — SIGNIFICANT CHANGE UP (ref 8.4–10.5)
CHLORIDE SERPL-SCNC: 103 MMOL/L — SIGNIFICANT CHANGE UP (ref 98–107)
CO2 SERPL-SCNC: 20 MMOL/L — LOW (ref 22–29)
COLOR SPEC: YELLOW — SIGNIFICANT CHANGE UP
CREAT ?TM UR-MCNC: 90 MG/DL — SIGNIFICANT CHANGE UP
CREAT SERPL-MCNC: 0.32 MG/DL — LOW (ref 0.5–1.3)
DIFF PNL FLD: NEGATIVE — SIGNIFICANT CHANGE UP
EGFR: 143 ML/MIN/1.73M2 — SIGNIFICANT CHANGE UP
EOSINOPHIL # BLD AUTO: 0.06 K/UL — SIGNIFICANT CHANGE UP (ref 0–0.5)
EOSINOPHIL NFR BLD AUTO: 0.6 % — SIGNIFICANT CHANGE UP (ref 0–6)
EPI CELLS # UR: SIGNIFICANT CHANGE UP
GLUCOSE SERPL-MCNC: 106 MG/DL — HIGH (ref 70–99)
GLUCOSE UR QL: 50 MG/DL
HCT VFR BLD CALC: 32.8 % — LOW (ref 34.5–45)
HGB BLD-MCNC: 11.2 G/DL — LOW (ref 11.5–15.5)
IMM GRANULOCYTES NFR BLD AUTO: 0.5 % — SIGNIFICANT CHANGE UP (ref 0–1.5)
KETONES UR-MCNC: NEGATIVE — SIGNIFICANT CHANGE UP
LDH SERPL L TO P-CCNC: 267 U/L — HIGH (ref 98–192)
LEUKOCYTE ESTERASE UR-ACNC: ABNORMAL
LYMPHOCYTES # BLD AUTO: 19.2 % — SIGNIFICANT CHANGE UP (ref 13–44)
LYMPHOCYTES # BLD AUTO: 2 K/UL — SIGNIFICANT CHANGE UP (ref 1–3.3)
MCHC RBC-ENTMCNC: 30.8 PG — SIGNIFICANT CHANGE UP (ref 27–34)
MCHC RBC-ENTMCNC: 34.1 GM/DL — SIGNIFICANT CHANGE UP (ref 32–36)
MCV RBC AUTO: 90.1 FL — SIGNIFICANT CHANGE UP (ref 80–100)
MONOCYTES # BLD AUTO: 0.82 K/UL — SIGNIFICANT CHANGE UP (ref 0–0.9)
MONOCYTES NFR BLD AUTO: 7.9 % — SIGNIFICANT CHANGE UP (ref 2–14)
NEUTROPHILS # BLD AUTO: 7.5 K/UL — HIGH (ref 1.8–7.4)
NEUTROPHILS NFR BLD AUTO: 71.7 % — SIGNIFICANT CHANGE UP (ref 43–77)
NITRITE UR-MCNC: NEGATIVE — SIGNIFICANT CHANGE UP
PH UR: 7 — SIGNIFICANT CHANGE UP (ref 5–8)
PLATELET # BLD AUTO: 174 K/UL — SIGNIFICANT CHANGE UP (ref 150–400)
POTASSIUM SERPL-MCNC: 4.2 MMOL/L — SIGNIFICANT CHANGE UP (ref 3.5–5.3)
POTASSIUM SERPL-SCNC: 4.2 MMOL/L — SIGNIFICANT CHANGE UP (ref 3.5–5.3)
PROT ?TM UR-MCNC: 8 MG/DL — SIGNIFICANT CHANGE UP (ref 0–12)
PROT SERPL-MCNC: 6.6 G/DL — SIGNIFICANT CHANGE UP (ref 6.6–8.7)
PROT UR-MCNC: NEGATIVE — SIGNIFICANT CHANGE UP
PROT/CREAT UR-RTO: 0.1 RATIO — SIGNIFICANT CHANGE UP
RBC # BLD: 3.64 M/UL — LOW (ref 3.8–5.2)
RBC # FLD: 12.4 % — SIGNIFICANT CHANGE UP (ref 10.3–14.5)
RBC CASTS # UR COMP ASSIST: NEGATIVE /HPF — SIGNIFICANT CHANGE UP (ref 0–4)
SODIUM SERPL-SCNC: 134 MMOL/L — LOW (ref 135–145)
SP GR SPEC: 1.01 — SIGNIFICANT CHANGE UP (ref 1.01–1.02)
URATE SERPL-MCNC: 2.6 MG/DL — SIGNIFICANT CHANGE UP (ref 2.4–5.7)
UROBILINOGEN FLD QL: NEGATIVE MG/DL — SIGNIFICANT CHANGE UP
WBC # BLD: 10.44 K/UL — SIGNIFICANT CHANGE UP (ref 3.8–10.5)
WBC # FLD AUTO: 10.44 K/UL — SIGNIFICANT CHANGE UP (ref 3.8–10.5)
WBC UR QL: SIGNIFICANT CHANGE UP /HPF (ref 0–5)

## 2022-08-20 PROCEDURE — 59025 FETAL NON-STRESS TEST: CPT

## 2022-08-20 PROCEDURE — 36415 COLL VENOUS BLD VENIPUNCTURE: CPT

## 2022-08-20 PROCEDURE — 82570 ASSAY OF URINE CREATININE: CPT

## 2022-08-20 PROCEDURE — G0463: CPT

## 2022-08-20 PROCEDURE — 83615 LACTATE (LD) (LDH) ENZYME: CPT

## 2022-08-20 PROCEDURE — 84156 ASSAY OF PROTEIN URINE: CPT

## 2022-08-20 PROCEDURE — 85025 COMPLETE CBC W/AUTO DIFF WBC: CPT

## 2022-08-20 PROCEDURE — 84550 ASSAY OF BLOOD/URIC ACID: CPT

## 2022-08-20 PROCEDURE — 80053 COMPREHEN METABOLIC PANEL: CPT

## 2022-08-20 PROCEDURE — 99234 HOSP IP/OBS SM DT SF/LOW 45: CPT

## 2022-08-20 PROCEDURE — 81001 URINALYSIS AUTO W/SCOPE: CPT

## 2022-08-20 RX ORDER — ACETAMINOPHEN 500 MG
975 TABLET ORAL ONCE
Refills: 0 | Status: DISCONTINUED | OUTPATIENT
Start: 2022-08-20 | End: 2022-09-04

## 2022-08-20 RX ORDER — LABETALOL HCL 100 MG
1 TABLET ORAL
Qty: 60 | Refills: 1
Start: 2022-08-20 | End: 2022-10-18

## 2022-08-20 NOTE — OB RN TRIAGE NOTE - FALL HARM RISK - UNIVERSAL INTERVENTIONS
Bed in lowest position, wheels locked, appropriate side rails in place/Call bell, personal items and telephone in reach/Instruct patient to call for assistance before getting out of bed or chair/Non-slip footwear when patient is out of bed/[place] to call system/Physically safe environment - no spills, clutter or unnecessary equipment/Purposeful Proactive Rounding/Room/bathroom lighting operational, light cord in reach

## 2022-08-20 NOTE — OB PROVIDER TRIAGE NOTE - NSOBPROVIDERNOTE_OBGYN_ALL_OB_FT
A/P: BENI CHAHAL is a 32y  at 25w5d GA by LMP c/w 1st trimester U/S (TARAS: 2022) who presents to L&D for abdominal pain and r/o preeclampsia likely d/t round ligament pain.     #Lower abdominal pain   - 7/10 sharp abdominal pain since this morning not improved with Tylenol  - Pt reports no PO water intake, pain is now improved with PO water intake here  - Pain reproduced on abdominal exam   - FHT: baseline 150, nonreactive, will continue to monitor her   - Pt educated to stay well hydrated     #HTN  [place] [facility] [place] [facility] [place] here for r/o preeclampsia   - Here BP have been 136/62 (22 @ 16:03) (136/62 - 136/62)  - Protein/Creatinine Ratio Calculation: 0.1  - AST/ALT: 27/10   - Cr: 0.32  - F/u with OB provider to manage HTN   - Return precautions for preeclampsia, labor, infection discussed: pt should return for any HA, vision changes, abdominal pain, increased swelling, vaginal bleeding, leakage of fluid, painful contractions, decreased fetal movement, fever, chills, LOC, dizziness, or any new or worsening symptoms     Dispo: continue to monitor     D/w Dr. Escoto A/P: BENI CHAHAL is a 32y  at 25w5d GA by LMP c/w 1st trimester U/S (TARAS: 2022) who presents to L&D for abdominal pain and r/o preeclampsia likely d/t round ligament pain.     #Lower abdominal pain   - 7/10 sharp abdominal pain since this morning not improved with Tylenol  - Pt reports no PO water intake, pain is now improved with PO water intake here  - Pain reproduced on abdominal exam   - FHT: baseline 150, nonreactive, will await reactivity   - Pt educated to stay well hydrated     #HTN  [place] [facility] [place] here for r/o preeclampsia   - Here BP have been 136/62 (22 @ 16:03) (136/62 - 136/62)  - Protein/Creatinine Ratio Calculation: 0.1  - AST/ALT: /10   - Cr: 0.32  - F/u with OB provider to manage HTN   - Return precautions for preeclampsia, labor, infection discussed: pt should return for any HA, vision changes, abdominal pain, increased swelling, vaginal bleeding, leakage of fluid, painful contractions, decreased fetal movement, fever, chills, LOC, dizziness, or any new or worsening symptoms     Dispo: continue to monitor     D/w Dr. Escoto A/P: BENI CHAHAL is a 32y  at 25w5d GA by LMP c/w 1st trimester U/S (TARAS: 2022) who presents to L&D for abdominal pain and r/o preeclampsia likely d/t round ligament pain.     #Lower abdominal pain   - 7/10 sharp abdominal pain since this morning not improved with Tylenol  - Pt reports no PO water intake, pain is now improved with PO water intake here  - Pain reproduced on abdominal exam   - FHT: baseline 150, nonreactive, will await reactivity   - Pt educated to stay well hydrated     #HTN  - /88 and 176/90 [place] [facility] [place], which prompted them to transfer here for r/o preeclampsia   - Here BP have been 136/62 (22 @ 16:03) (136/62 - 136/62)  - Protein/Creatinine Ratio Calculation: 0.1  - AST/ALT: /10   - Cr: 0.32  - F/u with OB provider to manage HTN   - Return precautions for preeclampsia, labor, infection discussed: pt should return for any HA, vision changes, abdominal pain, increased swelling, vaginal bleeding, leakage of fluid, painful contractions, decreased fetal movement, fever, chills, LOC, dizziness, or any new or worsening symptoms     Dispo: continue to monitor     D/w Dr. Escoto    22 @ 19:35    Patient re-evaluated   - NST reactive  - asymptomatic  - PIH WNL  - meets diagnosis for pregestational (chronic) HTN given noted BPs elevated prior to pregnancy in [place]  - counseled on signs/sx meriting return to hospital  - BP cuff sent; labetalol 100 BID sent per  Dr Benson  - pt advised to keep BP log  - follow up: 1 week  - Dispo: safe for d/c home with return precautions  Discussed with Dr Escoto and Dr Benson A/P: BENI CHAHAL is a 32y  at 25w5d GA by LMP c/w 1st trimester U/S (TARAS: 2022) who presents to L&D for abdominal pain and r/o preeclampsia likely d/t round ligament pain.     #Lower abdominal pain   - 7/10 sharp abdominal pain since this morning not improved with Tylenol  - Pt reports no PO water intake, pain is now improved with PO water intake here  - Pain reproduced on abdominal exam   - FHT: baseline 150, nonreactive, will await reactivity   - Pt educated to stay well hydrated     #HTN  - /88 and 176/90 at [facility] [place], which prompted them to transfer here for r/o preeclampsia   - Here BP have been 136/62 (22 @ 16:03) (136/62 - 136/62)  - Protein/Creatinine Ratio Calculation: 0.1  - AST/ALT: /10   - Cr: 0.32  - F/u with OB provider to manage HTN   - Return precautions for preeclampsia, labor, infection discussed: pt should return for any HA, vision changes, abdominal pain, increased swelling, vaginal bleeding, leakage of fluid, painful contractions, decreased fetal movement, fever, chills, LOC, dizziness, or any new or worsening symptoms     Dispo: continue to monitor     D/w Dr. Escoto    22 @ 19:35    Patient re-evaluated   - NST reactive  - asymptomatic  - PIH WNL  - meets diagnosis for pregestational (chronic) HTN given noted BPs elevated prior to pregnancy in [place]  - counseled on signs/sx meriting return to hospital  - BP cuff sent; labetalol 100 BID sent per  Dr Benson  - pt advised to keep BP log  - follow up: 1 week  - Dispo: safe for d/c home with return precautions    Discussed with Dr Escoto and Dr Benson

## 2022-08-20 NOTE — OB PROVIDER TRIAGE NOTE - HISTORY OF PRESENT ILLNESS
BENI CHAHAL is a 32y  at 25w5d GA by LMP c/w 1st trimester U/S (TARAS: 2022)  who presents to L&D for .     Pt denies vaginal bleeding, contractions and leakage of fluid. She endorses good fetal movement.     Pt denies trauma. Her last cervical exam was . Last sexual intercourse was .     Pt denies headaches, visual disturbances, RUQ pain, epigastric pain and new-onset edema.     She denies any urinary complaints.     She denies fevers, chills, nausea, vomiting.     She denies shortness of breath, chest pain, and palpitations.    Pregnancy course is  uncomplicated.   Pregnancy course is significant for:    POB:  PGYN: -fibroids/-cysts, denied STD hx, denies abnormal PAPs  PMH:  PSH:  SH: Denies tobacco use, EtOH use and illicit drug use during the pregnancy; Feels safe at home  Meds:  All:    T(C): 36.5 (22 @ 16:03), Max: 36.5 (22 @ 16:03)  HR: 99 (- @ 16:03) (99 - 99)  BP: 136/62 (20-22 @ 16:03) (136/62 - 136/62)  RR: 18 (- @ 16:03) (18 - 18)  SpO2: --  Gen: NAD, well-appearing  Heart: S1 S2, RRR  Lungs: CTAB  Abd: soft, gravid  Ext: non-edematous, non-tender   SVE:   SSE: cervix visualized, closed and without any signs of bleeding or drainage, no pooling   FHT:   [place]:    A/P:     Fetus:  [place]:  Dispo: Continue to observe.     Discussed with    BENI CHAHAL is a 32y  at 25w5d GA by LMP c/w 1st trimester U/S (TARAS: 2022) who presents to L&D for abdominal pain and r/o Preeclampsia. Pt states that this morning she started having b/l lower abdominal pain, which she describes as sharp. She states the pain is worse with movement and standing. At rest pain is 1/10 and with movement and standing pain is worsened to a 7/10 in severity. At home she attempted to take x2 Tylenol extra strength (last at noon today) as well as applying a heating pad for an hour, which did not improve her pain. This caused her to go to [facility] [place] where she had two high blood pressure readings of 180/88 and 176/90. She was then transferred by helicopter to our L&D triage for r/o preeclampsia. Pt denies vaginal bleeding, contractions and leakage of fluid. She endorses good fetal movement. Pt denies trauma. Last sexual intercourse was last week. Pt denies headaches, visual disturbances, RUQ pain, epigastric pain and new-onset edema. She denies any urinary complaints. She denies fevers, chills, nausea, vomiting. She denies shortness of breath, chest pain, and palpitations. Pregnancy course is significant for:     POB: G1: 2017 SAB (D&C), G2: 2018  c/b GDMA2 (on insulin) and gestational HTN, G3: current   PGYN: -fibroids/+cysts, denied STD hx, denies abnormal PAPs  PMH: HTN  PSH: tonsillectomy/adenoidectomy, D&C  SH: Denies tobacco use, EtOH use and illicit drug use during the pregnancy; Feels safe at home  Meds: PNV  All: NKA     T(C): 36.5 (22 @ 16:03), Max: 36.5 (22 @ 16:03)  HR: 99 (22 @ 16:03) (99 - 99)  BP: 136/62 (22 @ 16:03) (136/62 - 136/62)  RR: 18 (22 @ 16:03) (18 - 18)  SpO2: --  Gen: NAD, well-appearing  Heart: S1 S2, RRR  Lungs: CTAB  Abd: soft, gravid  Ext: non-edematous, non-tender   SVE:   SSE: cervix visualized, closed and without any signs of bleeding or drainage, no pooling   FHT:   [place]:    A/P:     Fetus:  [place]:  Dispo: Continue to observe.     Discussed with    BENI CHAHAL is a 32y  at 25w5d GA by LMP c/w 1st trimester U/S (TARAS: 2022) who presents to L&D for abdominal pain and r/o Preeclampsia. Pt states that this morning she started having b/l lower abdominal pain, which she describes as sharp. She states the pain is worse with movement and standing. At rest pain is 1/10 and with movement and standing pain is worsened to a 7/10 in severity. At home she attempted to take x2 Tylenol extra strength (last at noon today) as well as applying a heating pad for an hour, which did not improve her pain. This caused her to go [place] [facility] [place] had two high blood pressure readings of 180/88 and 176/90. She was then transferred by helicopter to our L&D triage for r/o preeclampsia. Pt denies vaginal bleeding, contractions and leakage of fluid. She endorses good fetal movement. Pt denies trauma. Last sexual intercourse was last week. Pt denies headaches, visual disturbances, RUQ pain, epigastric pain and new-onset edema. She denies any urinary complaints. She denies fevers, chills, nausea, vomiting. She denies shortness of breath, chest pain, and palpitations. She denies PO water intake today. Pregnancy course has been uncomplicated. Past pregnancy c/b GDMA2 and chronic HTN.     POB: G1: 2017 SAB (D&C), G2: 2018  c/b GDMA2 (on insulin) and chronic HTN, G3: current   PGYN: -fibroids/+cysts, denied STD hx, denies abnormal PAPs  PMH: HTN  PSH: tonsillectomy/adenoidectomy, D&C  SH: Denies tobacco use, EtOH use and illicit drug use during the pregnancy; Feels safe at home  Meds: PNV  All: NKA     T(C): 36.5 (22 @ 16:03), Max: 36.5 (22 @ 16:03)  HR: 99 (22 @ 16:03) (99 - 99)  BP: 136/62 (22 @ 16:03) (136/62 - 136/62)  RR: 18 (22 @ 16:03) (18 - 18)  SpO2: --  Gen: NAD, well-appearing  Heart: S1 S2, RRR  Lungs: CTAB  Abd: soft, gravid  Ext: non-edematous, non-tender   SVE:   SSE: cervix visualized, closed and without any signs of bleeding or drainage, no pooling   FHT:   [place]:    A/P:     Fetus:  [place]:  Dispo: Continue to observe.     Discussed with    BENI CHAHAL is a 32y  at 25w5d GA by LMP c/w 1st trimester U/S (TARAS: 2022) who presents to L&D for abdominal pain and r/o Preeclampsia. Pt states that this morning she started having b/l lower abdominal pain, which she describes as sharp. She states the pain is worse with movement and standing. At rest pain is 1/10 and with movement and standing pain is worsened to a 7/10 in severity. At home she attempted to take x2 Tylenol extra strength (last at noon today) as well as applying a heating pad for an hour, which did not improve her pain. [place] [facility] [place] two [place] blood pressure readings of 180/88 and 176/90. She was then transferred by helicopter to our L&D triage for r/o preeclampsia. Pt denies vaginal bleeding, contractions and leakage of fluid. She endorses good fetal movement. Pt denies trauma. Last sexual intercourse was last week. Pt denies headaches, visual disturbances, RUQ pain, epigastric pain and new-onset edema. She denies any urinary complaints. She denies fevers, chills, nausea, vomiting. She denies shortness of breath, chest pain, and palpitations. She denies PO water intake today. Pregnancy course has been uncomplicated. Past pregnancy c/b GDMA2 and chronic HTN.     POB: G1: 2017 SAB (D&C), G2: 2018  c/b GDMA2 (on insulin) and chronic HTN, G3: current   PGYN: -fibroids/+cysts, denied STD hx, denies abnormal PAPs  PMH: HTN  PSH: tonsillectomy/adenoidectomy, D&C  SH: Denies tobacco use, EtOH use and illicit drug use during the pregnancy; Feels safe at home  Meds: PNV  All: NKA

## 2022-08-20 NOTE — OB PROVIDER TRIAGE NOTE - ATTENDING COMMENTS
32y  at 25w5d GA by LMP c/w 1st trimester U/S (TARAS: 2022) who presents to L&D via helicopter [place] [facility] [place] of abdominal pain and preeclampsia after being found to have elevated blood pressures.   She was found to not be in labor, no toco activity and pain was characteristic of round ligament pain with +pain with upward traction on uterus. She was hydrated and pain was improved.   Blood pressures were WNL during evaluation here, PIH workup was negative.   She was started on maintenance medication, per her OB, and discharged home for follow up outpatient in 3days.   No other symptoms of PIH, labs WNL, FHT reassuring, toco without activity, no other OB issues/complaints.

## 2022-08-20 NOTE — OB PROVIDER TRIAGE NOTE - NSHPLABSRESULTS_GEN_ALL_CORE
11.2   10.44 )-----------( 174      ( 20 Aug 2022 16:40 )             32.8     Comprehensive Metabolic Panel (08.20.22 @ 16:40)    Sodium, Serum: 134 mmol/L    Potassium, Serum: 4.2: Hemolyzed. Interpret with caution mmol/L    Chloride, Serum: 103 mmol/L    Carbon Dioxide, Serum: 20.0 mmol/L    Anion Gap, Serum: 11 mmol/L    Blood Urea Nitrogen, Serum: 6.9 mg/dL    Creatinine, Serum: 0.32 mg/dL    Glucose, Serum: 106 mg/dL    Calcium, Total Serum: 8.6 mg/dL    Protein Total, Serum: 6.6 g/dL    Albumin, Serum: 3.3 g/dL    Bilirubin Total, Serum: 0.4 mg/dL    Alkaline Phosphatase, Serum: 42 U/L    Aspartate Aminotransferase (AST/SGOT): 27: Hemolyzed. Interpret with caution U/L    Alanine Aminotransferase (ALT/SGPT): 10 U/L    eGFR: 143: The estimated glomerular filtration rate (eGFR) is calculated using the    Urine Microscopic-Add On (NC) (08.20.22 @ 18:15)    Red Blood Cell - Urine: Negative /HPF    White Blood Cell - Urine: 0-2 /HPF    Epithelial Cells: Few    Protein/Creatinine Ratio, Urine (08.20.22 @ 18:15)    Total Protein, Random Urine: 8.0 mg/dL    Protein/Creatinine Ratio Calculation: 0.1 Ratio    Creatinine, Random Urine: 90: Reference Ranges have NOT been established for random urine analytes due  to variability in fluid intake and concentration. mg/dL

## 2022-08-20 NOTE — OB PROVIDER TRIAGE NOTE - NSHPPHYSICALEXAM_GEN_ALL_CORE
T(C): 36.5 (08-20-22 @ 16:03), Max: 36.5 (08-20-22 @ 16:03)  HR: 99 (08-20-22 @ 16:03) (99 - 99)  BP: 136/62 (08-20-22 @ 16:03) (136/62 - 136/62)  RR: 18 (08-20-22 @ 16:03) (18 - 18)    Gen: NAD, well-appearing  Heart: S1 S2, RRR  Lungs: CTAB  Abd: soft, gravid, tender to palpation of the bilateral lower abdominal quadrants  Ext: non-edematous, non-tender, capillary refill <2 secs  FHT: baseline 150, moderate variability, -accelerations, - decelerations   [place]: not kaylynn

## 2022-08-25 ENCOUNTER — NON-APPOINTMENT (OUTPATIENT)
Age: 32
End: 2022-08-25

## 2022-08-28 LAB
BASOPHILS # BLD AUTO: 0.03 K/UL
BASOPHILS NFR BLD AUTO: 0.4 %
EOSINOPHIL # BLD AUTO: 0.11 K/UL
EOSINOPHIL NFR BLD AUTO: 1.3 %
GLUCOSE 1H P 50 G GLC PO SERPL-MCNC: 144 MG/DL
HCT VFR BLD CALC: 35.3 %
HGB BLD-MCNC: 11.1 G/DL
IMM GRANULOCYTES NFR BLD AUTO: 0.4 %
LYMPHOCYTES # BLD AUTO: 2.39 K/UL
LYMPHOCYTES NFR BLD AUTO: 28 %
MAN DIFF?: NORMAL
MCHC RBC-ENTMCNC: 30.2 PG
MCHC RBC-ENTMCNC: 31.4 GM/DL
MCV RBC AUTO: 95.9 FL
MONOCYTES # BLD AUTO: 0.75 K/UL
MONOCYTES NFR BLD AUTO: 8.8 %
NEUTROPHILS # BLD AUTO: 5.22 K/UL
NEUTROPHILS NFR BLD AUTO: 61.1 %
PLATELET # BLD AUTO: 189 K/UL
RBC # BLD: 3.68 M/UL
RBC # FLD: 12.7 %
WBC # FLD AUTO: 8.53 K/UL

## 2022-08-29 DIAGNOSIS — R73.02 IMPAIRED GLUCOSE TOLERANCE (ORAL): ICD-10-CM

## 2022-09-02 ENCOUNTER — NON-APPOINTMENT (OUTPATIENT)
Age: 32
End: 2022-09-02

## 2022-09-07 ENCOUNTER — APPOINTMENT (OUTPATIENT)
Dept: OBGYN | Facility: CLINIC | Age: 32
End: 2022-09-07

## 2022-09-07 VITALS
WEIGHT: 278 LBS | SYSTOLIC BLOOD PRESSURE: 130 MMHG | DIASTOLIC BLOOD PRESSURE: 80 MMHG | HEIGHT: 69 IN | BODY MASS INDEX: 41.18 KG/M2

## 2022-09-07 PROCEDURE — 0502F SUBSEQUENT PRENATAL CARE: CPT

## 2022-09-16 ENCOUNTER — NON-APPOINTMENT (OUTPATIENT)
Age: 32
End: 2022-09-16

## 2022-10-05 ENCOUNTER — ASOB RESULT (OUTPATIENT)
Age: 32
End: 2022-10-05

## 2022-10-05 ENCOUNTER — APPOINTMENT (OUTPATIENT)
Dept: ANTEPARTUM | Facility: CLINIC | Age: 32
End: 2022-10-05

## 2022-10-05 PROCEDURE — 76816 OB US FOLLOW-UP PER FETUS: CPT

## 2022-10-11 ENCOUNTER — NON-APPOINTMENT (OUTPATIENT)
Age: 32
End: 2022-10-11

## 2022-10-27 ENCOUNTER — APPOINTMENT (OUTPATIENT)
Dept: OBGYN | Facility: CLINIC | Age: 32
End: 2022-10-27

## 2022-10-27 VITALS
BODY MASS INDEX: 42.95 KG/M2 | SYSTOLIC BLOOD PRESSURE: 141 MMHG | DIASTOLIC BLOOD PRESSURE: 87 MMHG | WEIGHT: 290 LBS | HEIGHT: 69 IN

## 2022-10-27 PROCEDURE — 0502F SUBSEQUENT PRENATAL CARE: CPT

## 2022-10-31 ENCOUNTER — OUTPATIENT (OUTPATIENT)
Dept: INPATIENT UNIT | Facility: HOSPITAL | Age: 32
LOS: 1 days | End: 2022-10-31
Payer: MEDICAID

## 2022-10-31 ENCOUNTER — NON-APPOINTMENT (OUTPATIENT)
Age: 32
End: 2022-10-31

## 2022-10-31 VITALS
DIASTOLIC BLOOD PRESSURE: 83 MMHG | TEMPERATURE: 98 F | HEART RATE: 120 BPM | RESPIRATION RATE: 18 BRPM | SYSTOLIC BLOOD PRESSURE: 140 MMHG

## 2022-10-31 VITALS
DIASTOLIC BLOOD PRESSURE: 74 MMHG | RESPIRATION RATE: 17 BRPM | TEMPERATURE: 98 F | HEART RATE: 89 BPM | SYSTOLIC BLOOD PRESSURE: 119 MMHG

## 2022-10-31 DIAGNOSIS — Z90.89 ACQUIRED ABSENCE OF OTHER ORGANS: Chronic | ICD-10-CM

## 2022-10-31 DIAGNOSIS — O47.03 FALSE LABOR BEFORE 37 COMPLETED WEEKS OF GESTATION, THIRD TRIMESTER: ICD-10-CM

## 2022-10-31 LAB
ALBUMIN SERPL ELPH-MCNC: 3.6 G/DL — SIGNIFICANT CHANGE UP (ref 3.3–5.2)
ALP SERPL-CCNC: 92 U/L — SIGNIFICANT CHANGE UP (ref 40–120)
ALT FLD-CCNC: 12 U/L — SIGNIFICANT CHANGE UP
ANION GAP SERPL CALC-SCNC: 10 MMOL/L — SIGNIFICANT CHANGE UP (ref 5–17)
APPEARANCE UR: CLEAR — SIGNIFICANT CHANGE UP
APTT BLD: 28.7 SEC — SIGNIFICANT CHANGE UP (ref 27.5–35.5)
AST SERPL-CCNC: 31 U/L — SIGNIFICANT CHANGE UP
BACTERIA # UR AUTO: ABNORMAL
BASOPHILS # BLD AUTO: 0.02 K/UL — SIGNIFICANT CHANGE UP (ref 0–0.2)
BASOPHILS NFR BLD AUTO: 0.2 % — SIGNIFICANT CHANGE UP (ref 0–2)
BILIRUB SERPL-MCNC: 0.7 MG/DL — SIGNIFICANT CHANGE UP (ref 0.4–2)
BILIRUB UR-MCNC: NEGATIVE — SIGNIFICANT CHANGE UP
BUN SERPL-MCNC: 7.9 MG/DL — LOW (ref 8–20)
CALCIUM SERPL-MCNC: 9.5 MG/DL — SIGNIFICANT CHANGE UP (ref 8.4–10.5)
CHLORIDE SERPL-SCNC: 102 MMOL/L — SIGNIFICANT CHANGE UP (ref 96–108)
CO2 SERPL-SCNC: 21 MMOL/L — LOW (ref 22–29)
COLOR SPEC: YELLOW — SIGNIFICANT CHANGE UP
CREAT ?TM UR-MCNC: 231 MG/DL — SIGNIFICANT CHANGE UP
CREAT SERPL-MCNC: 0.31 MG/DL — LOW (ref 0.5–1.3)
DIFF PNL FLD: ABNORMAL
EGFR: 144 ML/MIN/1.73M2 — SIGNIFICANT CHANGE UP
EOSINOPHIL # BLD AUTO: 0.11 K/UL — SIGNIFICANT CHANGE UP (ref 0–0.5)
EOSINOPHIL NFR BLD AUTO: 1.1 % — SIGNIFICANT CHANGE UP (ref 0–6)
EPI CELLS # UR: ABNORMAL
FIBRINOGEN PPP-MCNC: 800 MG/DL — CRITICAL HIGH (ref 330–520)
GLUCOSE SERPL-MCNC: 64 MG/DL — LOW (ref 70–99)
GLUCOSE UR QL: 250 MG/DL
HCT VFR BLD CALC: 35 % — SIGNIFICANT CHANGE UP (ref 34.5–45)
HGB BLD-MCNC: 11.6 G/DL — SIGNIFICANT CHANGE UP (ref 11.5–15.5)
IMM GRANULOCYTES NFR BLD AUTO: 0.7 % — SIGNIFICANT CHANGE UP (ref 0–0.9)
INR BLD: 1.07 RATIO — SIGNIFICANT CHANGE UP (ref 0.88–1.16)
KETONES UR-MCNC: ABNORMAL
LDH SERPL L TO P-CCNC: 282 U/L — HIGH (ref 98–192)
LEUKOCYTE ESTERASE UR-ACNC: ABNORMAL
LYMPHOCYTES # BLD AUTO: 2.7 K/UL — SIGNIFICANT CHANGE UP (ref 1–3.3)
LYMPHOCYTES # BLD AUTO: 25.9 % — SIGNIFICANT CHANGE UP (ref 13–44)
MCHC RBC-ENTMCNC: 28.6 PG — SIGNIFICANT CHANGE UP (ref 27–34)
MCHC RBC-ENTMCNC: 33.1 GM/DL — SIGNIFICANT CHANGE UP (ref 32–36)
MCV RBC AUTO: 86.2 FL — SIGNIFICANT CHANGE UP (ref 80–100)
MONOCYTES # BLD AUTO: 1.13 K/UL — HIGH (ref 0–0.9)
MONOCYTES NFR BLD AUTO: 10.8 % — SIGNIFICANT CHANGE UP (ref 2–14)
NEUTROPHILS # BLD AUTO: 6.41 K/UL — SIGNIFICANT CHANGE UP (ref 1.8–7.4)
NEUTROPHILS NFR BLD AUTO: 61.3 % — SIGNIFICANT CHANGE UP (ref 43–77)
NITRITE UR-MCNC: NEGATIVE — SIGNIFICANT CHANGE UP
PH UR: 6 — SIGNIFICANT CHANGE UP (ref 5–8)
PLATELET # BLD AUTO: 157 K/UL — SIGNIFICANT CHANGE UP (ref 150–400)
POTASSIUM SERPL-MCNC: 4.1 MMOL/L — SIGNIFICANT CHANGE UP (ref 3.5–5.3)
POTASSIUM SERPL-SCNC: 4.1 MMOL/L — SIGNIFICANT CHANGE UP (ref 3.5–5.3)
PROT ?TM UR-MCNC: 28 MG/DL — HIGH (ref 0–12)
PROT SERPL-MCNC: 7.2 G/DL — SIGNIFICANT CHANGE UP (ref 6.6–8.7)
PROT UR-MCNC: NEGATIVE — SIGNIFICANT CHANGE UP
PROT/CREAT UR-RTO: 0.1 RATIO — SIGNIFICANT CHANGE UP
PROTHROM AB SERPL-ACNC: 12.4 SEC — SIGNIFICANT CHANGE UP (ref 10.5–13.4)
RBC # BLD: 4.06 M/UL — SIGNIFICANT CHANGE UP (ref 3.8–5.2)
RBC # FLD: 13.7 % — SIGNIFICANT CHANGE UP (ref 10.3–14.5)
RBC CASTS # UR COMP ASSIST: SIGNIFICANT CHANGE UP /HPF (ref 0–4)
SODIUM SERPL-SCNC: 133 MMOL/L — LOW (ref 135–145)
SP GR SPEC: 1.03 — HIGH (ref 1.01–1.02)
TROPONIN T SERPL-MCNC: <0.01 NG/ML — SIGNIFICANT CHANGE UP (ref 0–0.06)
URATE SERPL-MCNC: 3.3 MG/DL — SIGNIFICANT CHANGE UP (ref 2.4–5.7)
UROBILINOGEN FLD QL: 1 MG/DL
WBC # BLD: 10.44 K/UL — SIGNIFICANT CHANGE UP (ref 3.8–10.5)
WBC # FLD AUTO: 10.44 K/UL — SIGNIFICANT CHANGE UP (ref 3.8–10.5)
WBC UR QL: ABNORMAL /HPF (ref 0–5)

## 2022-10-31 PROCEDURE — 82570 ASSAY OF URINE CREATININE: CPT

## 2022-10-31 PROCEDURE — G0463: CPT

## 2022-10-31 PROCEDURE — 85610 PROTHROMBIN TIME: CPT

## 2022-10-31 PROCEDURE — 80053 COMPREHEN METABOLIC PANEL: CPT

## 2022-10-31 PROCEDURE — 93010 ELECTROCARDIOGRAM REPORT: CPT

## 2022-10-31 PROCEDURE — 84484 ASSAY OF TROPONIN QUANT: CPT

## 2022-10-31 PROCEDURE — 85025 COMPLETE CBC W/AUTO DIFF WBC: CPT

## 2022-10-31 PROCEDURE — 36415 COLL VENOUS BLD VENIPUNCTURE: CPT

## 2022-10-31 PROCEDURE — 84550 ASSAY OF BLOOD/URIC ACID: CPT

## 2022-10-31 PROCEDURE — 85384 FIBRINOGEN ACTIVITY: CPT

## 2022-10-31 PROCEDURE — 99234 HOSP IP/OBS SM DT SF/LOW 45: CPT

## 2022-10-31 PROCEDURE — 81001 URINALYSIS AUTO W/SCOPE: CPT

## 2022-10-31 PROCEDURE — 85730 THROMBOPLASTIN TIME PARTIAL: CPT

## 2022-10-31 PROCEDURE — 59025 FETAL NON-STRESS TEST: CPT

## 2022-10-31 PROCEDURE — 84156 ASSAY OF PROTEIN URINE: CPT

## 2022-10-31 PROCEDURE — 93005 ELECTROCARDIOGRAM TRACING: CPT

## 2022-10-31 PROCEDURE — 83615 LACTATE (LD) (LDH) ENZYME: CPT

## 2022-10-31 RX ORDER — SODIUM CHLORIDE 9 MG/ML
500 INJECTION, SOLUTION INTRAVENOUS ONCE
Refills: 0 | Status: COMPLETED | OUTPATIENT
Start: 2022-10-31 | End: 2022-10-31

## 2022-10-31 RX ORDER — ACETAMINOPHEN 500 MG
975 TABLET ORAL ONCE
Refills: 0 | Status: COMPLETED | OUTPATIENT
Start: 2022-10-31 | End: 2022-10-31

## 2022-10-31 RX ADMIN — Medication 975 MILLIGRAM(S): at 15:33

## 2022-10-31 RX ADMIN — Medication 975 MILLIGRAM(S): at 16:30

## 2022-10-31 RX ADMIN — SODIUM CHLORIDE 500 MILLILITER(S): 9 INJECTION, SOLUTION INTRAVENOUS at 15:35

## 2022-10-31 NOTE — OB RN TRIAGE NOTE - FALL HARM RISK - UNIVERSAL INTERVENTIONS
Bed in lowest position, wheels locked, appropriate side rails in place/Call bell, personal items and telephone in reach/Instruct patient to call for assistance before getting out of bed or chair/Non-slip footwear when patient is out of bed/[place] to call system/Physically safe environment - no spills, clutter or unnecessary equipment/Purposeful Proactive Rounding/Room/bathroom lighting operational, light cord in reach

## 2022-10-31 NOTE — OB PROVIDER TRIAGE NOTE - NSHPPHYSICALEXAM_GEN_ALL_CORE
Vital Signs Last 24 Hrs  T(C): 36.5 (31 Oct 2022 14:13), Max: 36.5 (31 Oct 2022 14:13)  T(F): 97.7 (31 Oct 2022 14:13), Max: 97.7 (31 Oct 2022 14:13)  HR: 98 (31 Oct 2022 15:12) (98 - 120)  BP: 140/83 (31 Oct 2022 14:19) (140/83 - 140/83)  RR: 18 (31 Oct 2022 14:13) (18 - 18)  SpO2: 100% (31 Oct 2022 15:07) (98% - 100%)    General: AAOx3, NAD  Heart: RRR  Lungs: CTAB  Abd: Soft, nontender, gravid  Extremities: +1 DTR    BMI: 41.05 kg/m2    FHT: 156 bpm, moderate variability, +accels, -deccels  [place]: no contractions    MFM sono: (10/6) vertex, posterior

## 2022-10-31 NOTE — OB PROVIDER TRIAGE NOTE - NSICDXFAMILYHX_GEN_ALL_CORE_FT
FAMILY HISTORY:  Mother  Still living? Yes, Estimated age: Age Unknown  Family history of diabetes mellitus, Age at diagnosis: Age Unknown    Grandparent  Still living? No  Family history of diabetes mellitus, Age at diagnosis: Age Unknown    Aunt  Still living? Yes, Estimated age: Age Unknown  Family history of diabetes mellitus, Age at diagnosis: Age Unknown

## 2022-10-31 NOTE — OB PROVIDER TRIAGE NOTE - HISTORY OF PRESENT ILLNESS
BENI CHAHAL is a 31yo  at 36w (dated by LMP 22, TARAS 22) w/ hx cHTN presenting for SOB and chest tightness x3 days. Patient states she has been experiencing SOB and chest tightness since Saturday, alongside headaches that come and go, one episode of spots/starts in her vision yesterday, and increased leg swelling. She states her headache pain is 4-5/10. Patient is prescribed labetalol 100mg BID for her cHTN, however she decreased the dose to only 100mg daily as it was causing her breast pain. Prior to experiencing these symptoms on Saturday, patient had been without her medication for 4-5 days and started her labetalol again yesterday. She was recommended by her OB to take aspirin daily, however she has not been taking it. Patient states she has a BP machine at home that she uses and she her BP has been ranging in the 150s/100s which prompted her to come in. In terms of OB complaints, she denies leakage of fluids, vaginal bleeding, painful contractions. Reports active fetal movement.    PNC @ [facility]    OBhx:   - (2019) , FT, c/b GDMA2, uncomplicated  - SAB x1 tx w/ D&C  Gyn: denies  PMH: cHTN  PSH: tonsilectomy/adenoidectomy in childhood  Med: PNV, labetalol 100mg BID  Allergies: NKDA  Social hx: denies, feels safe at home

## 2022-10-31 NOTE — OB PROVIDER TRIAGE NOTE - NSOBPROVIDERNOTE_OBGYN_ALL_OB_FT
A/P: BENI CHAHAL is a 31yo  at 36w (dated by LMP 22, TARAS 22) w/ hx cHTN being evaluated for SOB, chest tightness, and PEC r/o.    #PEC r/o  - /83 in triage  - PIH labs pending  - UA    #Chest pain/SOB  - Tylenol  - EKG  - troponins pending      #Maternal and fetal status, will continue to monitor  - VSS  - Cat 1 tracing  - Not kaylynn    D/w Dr. Stovall A/P: BENI CHAHAL is a 33yo  at 36w (dated by LMP 22, TARAS 22) w/ hx cHTN being evaluated for SOB, chest tightness, and PEC r/o.    #PEC r/o  - /83 in triage  - [facility] labs pending  - UA    #Chest pain/SOB  - Tylenol  - EKG  - troponins pending      #Maternal and fetal status, will continue to monitor  - VSS  - Cat 1 tracing  - Not kaylynn    D/w Dr. Caldwell    Addendum:  Patient received Tylenol for headache and she stated that pain is much improved. She no longer reports shortness of breath or chest pain. She stated that she will continue to take labetalol 100 mg BID for chronic hypertension and is aware of the risks and complications associated with uncontrolled hypertension in pregnancy. All labs and vital signs within normal limits prior to discharge.    Vital Signs Last 24 Hrs  T(C): 36.5 (31 Oct 2022 14:13), Max: 36.5 (31 Oct 2022 14:13)  T(F): 97.7 (31 Oct 2022 14:13), Max: 97.7 (31 Oct 2022 14:13)  HR: 89 (31 Oct 2022 17:06) (89 - 120)  BP: 119/74 (31 Oct 2022 17:06) (118/67 - 140/83)  RR: 18 (31 Oct 2022 14:13) (18 - 18)  SpO2: 100% (31 Oct 2022 16:22) (98% - 100%)    FHT: baseline 135bpm, moderate variability, +accels, -deccels  [place]: irregular contractions q 5-7 minutes                          11.6   10.44 )-----------( 157      ( 31 Oct 2022 16:10 )             35.0     10-31    133<L>  |  102  |  7.9<L>  ----------------------------<  64<L>  4.1   |  21.0<L>  |  0.31<L>    Ca    9.5      31 Oct 2022 16:10    TPro  7.2  /  Alb  3.6  /  TBili  0.7  /  DBili  x   /  AST  31  /  ALT  12  /  AlkPhos  92  10-31    Protein/Creatinine Ratio, Urine (10.31.22 @ 15:00)    Creatinine, Random Urine: 231: Reference Ranges have NOT been established for random urine analytes due  to variability in fluid intake and concentration. mg/dL    Total Protein, Random Urine: 28.0 mg/dL    Protein/Creatinine Ratio Calculation: 0.1 Ratio  Troponin T, Serum: <0.01 ng/mL (10.31.22 @ 16:10)    Plan  - follow up outpatient  - continue labetalol 100mg BID    d/w Dr. Caldwell

## 2022-11-02 ENCOUNTER — APPOINTMENT (OUTPATIENT)
Dept: ANTEPARTUM | Facility: CLINIC | Age: 32
End: 2022-11-02

## 2022-11-02 ENCOUNTER — APPOINTMENT (OUTPATIENT)
Dept: OBGYN | Facility: CLINIC | Age: 32
End: 2022-11-02

## 2022-11-02 ENCOUNTER — ASOB RESULT (OUTPATIENT)
Age: 32
End: 2022-11-02

## 2022-11-02 VITALS
SYSTOLIC BLOOD PRESSURE: 140 MMHG | DIASTOLIC BLOOD PRESSURE: 90 MMHG | HEIGHT: 69 IN | BODY MASS INDEX: 43.25 KG/M2 | WEIGHT: 292 LBS

## 2022-11-02 PROCEDURE — 0502F SUBSEQUENT PRENATAL CARE: CPT

## 2022-11-02 PROCEDURE — 76819 FETAL BIOPHYS PROFIL W/O NST: CPT

## 2022-11-02 PROCEDURE — 76816 OB US FOLLOW-UP PER FETUS: CPT | Mod: 59

## 2022-11-05 ENCOUNTER — LABORATORY RESULT (OUTPATIENT)
Age: 32
End: 2022-11-05

## 2022-11-07 ENCOUNTER — NON-APPOINTMENT (OUTPATIENT)
Age: 32
End: 2022-11-07

## 2022-11-07 ENCOUNTER — APPOINTMENT (OUTPATIENT)
Dept: OBGYN | Facility: CLINIC | Age: 32
End: 2022-11-07

## 2022-11-07 VITALS
SYSTOLIC BLOOD PRESSURE: 140 MMHG | WEIGHT: 292 LBS | HEIGHT: 69 IN | BODY MASS INDEX: 43.25 KG/M2 | DIASTOLIC BLOOD PRESSURE: 90 MMHG

## 2022-11-07 LAB — B-HEM STREP SPEC QL CULT: NORMAL

## 2022-11-07 PROCEDURE — 0502F SUBSEQUENT PRENATAL CARE: CPT

## 2022-11-08 ENCOUNTER — INPATIENT (INPATIENT)
Facility: HOSPITAL | Age: 32
LOS: 1 days | Discharge: HOME | End: 2022-11-10
Payer: MEDICAID

## 2022-11-08 VITALS — DIASTOLIC BLOOD PRESSURE: 70 MMHG | SYSTOLIC BLOOD PRESSURE: 131 MMHG | HEART RATE: 107 BPM

## 2022-11-08 DIAGNOSIS — Z90.89 ACQUIRED ABSENCE OF OTHER ORGANS: Chronic | ICD-10-CM

## 2022-11-08 DIAGNOSIS — O26.893 OTHER SPECIFIED PREGNANCY RELATED CONDITIONS, THIRD TRIMESTER: ICD-10-CM

## 2022-11-08 DIAGNOSIS — O47.1 FALSE LABOR AT OR AFTER 37 COMPLETED WEEKS OF GESTATION: ICD-10-CM

## 2022-11-08 LAB
ALP SERPL-CCNC: 98 U/L — SIGNIFICANT CHANGE UP (ref 40–120)
ALT FLD-CCNC: 11 U/L — SIGNIFICANT CHANGE UP
ANION GAP SERPL CALC-SCNC: 12 MMOL/L — SIGNIFICANT CHANGE UP (ref 5–17)
APTT BLD: 29.1 SEC — SIGNIFICANT CHANGE UP (ref 27.5–35.5)
AST SERPL-CCNC: 21 U/L — SIGNIFICANT CHANGE UP
BACTERIA # UR AUTO: ABNORMAL
BASOPHILS # BLD AUTO: 0.01 K/UL — SIGNIFICANT CHANGE UP (ref 0–0.2)
BASOPHILS NFR BLD AUTO: 0.1 % — SIGNIFICANT CHANGE UP (ref 0–2)
BILIRUB UR-MCNC: NEGATIVE — SIGNIFICANT CHANGE UP
BLD GP AB SCN SERPL QL: SIGNIFICANT CHANGE UP
CHLORIDE SERPL-SCNC: 103 MMOL/L — SIGNIFICANT CHANGE UP (ref 96–108)
CO2 SERPL-SCNC: 21 MMOL/L — LOW (ref 22–29)
COLOR SPEC: YELLOW — SIGNIFICANT CHANGE UP
COVID-19 SPIKE DOMAIN AB INTERP: POSITIVE
COVID-19 SPIKE DOMAIN ANTIBODY RESULT: >250 U/ML — HIGH
DIFF PNL FLD: NEGATIVE — SIGNIFICANT CHANGE UP
EGFR: 136 ML/MIN/1.73M2 — SIGNIFICANT CHANGE UP
EOSINOPHIL # BLD AUTO: 0.08 K/UL — SIGNIFICANT CHANGE UP (ref 0–0.5)
EOSINOPHIL NFR BLD AUTO: 1 % — SIGNIFICANT CHANGE UP (ref 0–6)
FIBRINOGEN PPP-MCNC: 796 MG/DL — CRITICAL HIGH (ref 330–520)
GLUCOSE UR QL: NEGATIVE MG/DL — SIGNIFICANT CHANGE UP
HCT VFR BLD CALC: 35.2 % — SIGNIFICANT CHANGE UP (ref 34.5–45)
HGB BLD-MCNC: 11.7 G/DL — SIGNIFICANT CHANGE UP (ref 11.5–15.5)
IMM GRANULOCYTES NFR BLD AUTO: 0.7 % — SIGNIFICANT CHANGE UP (ref 0–0.9)
KETONES UR-MCNC: ABNORMAL
LDH SERPL L TO P-CCNC: 178 U/L — SIGNIFICANT CHANGE UP (ref 98–192)
LYMPHOCYTES # BLD AUTO: 2.38 K/UL — SIGNIFICANT CHANGE UP (ref 1–3.3)
LYMPHOCYTES # BLD AUTO: 28.6 % — SIGNIFICANT CHANGE UP (ref 13–44)
MCHC RBC-ENTMCNC: 28.9 PG — SIGNIFICANT CHANGE UP (ref 27–34)
MCHC RBC-ENTMCNC: 33.2 GM/DL — SIGNIFICANT CHANGE UP (ref 32–36)
MCV RBC AUTO: 86.9 FL — SIGNIFICANT CHANGE UP (ref 80–100)
MONOCYTES # BLD AUTO: 0.81 K/UL — SIGNIFICANT CHANGE UP (ref 0–0.9)
MONOCYTES NFR BLD AUTO: 9.7 % — SIGNIFICANT CHANGE UP (ref 2–14)
NEUTROPHILS # BLD AUTO: 4.98 K/UL — SIGNIFICANT CHANGE UP (ref 1.8–7.4)
NEUTROPHILS NFR BLD AUTO: 59.9 % — SIGNIFICANT CHANGE UP (ref 43–77)
PLATELET # BLD AUTO: 153 K/UL — SIGNIFICANT CHANGE UP (ref 150–400)
PROT ?TM UR-MCNC: 42 MG/DL — HIGH (ref 0–12)
PROT ?TM UR-MCNC: 42 MG/DL — HIGH (ref 0–12)
RBC # BLD: 4.05 M/UL — SIGNIFICANT CHANGE UP (ref 3.8–5.2)
RBC # FLD: 14.1 % — SIGNIFICANT CHANGE UP (ref 10.3–14.5)
RBC CASTS # UR COMP ASSIST: SIGNIFICANT CHANGE UP /HPF (ref 0–4)
SARS-COV-2 IGG+IGM SERPL QL IA: >250 U/ML — HIGH
SARS-COV-2 IGG+IGM SERPL QL IA: POSITIVE
SARS-COV-2 RNA SPEC QL NAA+PROBE: SIGNIFICANT CHANGE UP
SODIUM SERPL-SCNC: 136 MMOL/L — SIGNIFICANT CHANGE UP (ref 135–145)
SP GR SPEC: 1.02 — SIGNIFICANT CHANGE UP (ref 1.01–1.02)
T PALLIDUM AB TITR SER: NEGATIVE — SIGNIFICANT CHANGE UP
URATE SERPL-MCNC: 3.2 MG/DL — SIGNIFICANT CHANGE UP (ref 2.4–5.7)
WBC # BLD: 8.32 K/UL — SIGNIFICANT CHANGE UP (ref 3.8–10.5)
WBC # FLD AUTO: 8.32 K/UL — SIGNIFICANT CHANGE UP (ref 3.8–10.5)
WBC UR QL: SIGNIFICANT CHANGE UP /HPF (ref 0–5)

## 2022-11-08 PROCEDURE — 59400 OBSTETRICAL CARE: CPT | Mod: U7

## 2022-11-08 RX ORDER — SODIUM CHLORIDE 9 MG/ML
1000 INJECTION, SOLUTION INTRAVENOUS
Refills: 0 | Status: DISCONTINUED | OUTPATIENT
Start: 2022-11-08 | End: 2022-11-09

## 2022-11-08 RX ORDER — CHLORHEXIDINE GLUCONATE 213 G/1000ML
1 SOLUTION TOPICAL ONCE
Refills: 0 | Status: DISCONTINUED | OUTPATIENT
Start: 2022-11-08 | End: 2022-11-09

## 2022-11-08 RX ORDER — OXYTOCIN 10 UNIT/ML
2 VIAL (ML) INJECTION
Qty: 30 | Refills: 0 | Status: DISCONTINUED | OUTPATIENT
Start: 2022-11-08 | End: 2022-11-10

## 2022-11-08 RX ORDER — ACETAMINOPHEN 500 MG
975 TABLET ORAL ONCE
Refills: 0 | Status: COMPLETED | OUTPATIENT
Start: 2022-11-08 | End: 2022-11-08

## 2022-11-08 RX ORDER — OXYTOCIN 10 UNIT/ML
333.33 VIAL (ML) INJECTION
Qty: 20 | Refills: 0 | Status: COMPLETED | OUTPATIENT
Start: 2022-11-08 | End: 2022-11-08

## 2022-11-08 RX ORDER — CITRIC ACID/SODIUM CITRATE 300-500 MG
30 SOLUTION, ORAL ORAL ONCE
Refills: 0 | Status: DISCONTINUED | OUTPATIENT
Start: 2022-11-08 | End: 2022-11-09

## 2022-11-08 RX ORDER — LABETALOL HCL 100 MG
100 TABLET ORAL ONCE
Refills: 0 | Status: COMPLETED | OUTPATIENT
Start: 2022-11-08 | End: 2022-11-08

## 2022-11-08 RX ADMIN — SODIUM CHLORIDE 125 MILLILITER(S): 9 INJECTION, SOLUTION INTRAVENOUS at 08:30

## 2022-11-08 RX ADMIN — Medication 1000 MILLIUNIT(S)/MIN: at 23:56

## 2022-11-08 RX ADMIN — Medication 2 MILLIUNIT(S)/MIN: at 17:36

## 2022-11-08 RX ADMIN — Medication 975 MILLIGRAM(S): at 08:29

## 2022-11-08 RX ADMIN — Medication 100 MILLIGRAM(S): at 08:30

## 2022-11-08 RX ADMIN — Medication 975 MILLIGRAM(S): at 15:12

## 2022-11-08 NOTE — OB RN DELIVERY SUMMARY - NS_SEPSISRSKCALC_OBGYN_ALL_OB_FT
EOS calculated successfully. EOS Risk Factor: 0.5/1000 live births ([facility] national incidence); GA=37w1d; Temp=99.14; ROM=6.15; GBS='Negative'; Antibiotics='No antibiotics or any antibiotics < 2 hrs prior to birth'

## 2022-11-08 NOTE — OB RN DELIVERY SUMMARY - NS_TIMERUPTUREDADMITTED_OBGYN_ALL_OB_FT
Carpal Tunnel: R intercarpal, L intercarpal  Date/Time: 11/1/2019 10:00 AM  Performed by: Alcides Yancey DO  Authorized by: Alcides Yancey, DO     Consent Done?:  Yes (Verbal)  Indications:  Diagnostic evaluation and pain  Site marked: The procedure site was marked    Timeout: Prior to procedure the correct patient, procedure, and site was verified      Location:  Wrist  Site:  R intercarpal and L intercarpal  Prep: Patient was prepped and draped in usual sterile fashion    Ultrasonic Guidance for needle placement: No  Needle size:  25 G  Approach:  [place]  Medications:  40 mg triamcinolone acetonide 40 mg/mL; 40 mg triamcinolone acetonide 40 mg/mL  Patient tolerance:  Patient tolerated the procedure well with no immediate complications       6 Hour(s) 9 Minute(s)

## 2022-11-08 NOTE — OB PROVIDER H&P - NSHPPHYSICALEXAM_GEN_ALL_CORE
Vital Signs Last 24 Hrs  HR: 97 (08 Nov 2022 07:58) (94 - 107)  BP: 137/80 (08 Nov 2022 07:58) (125/63 - 137/80)      Gen: NAD, well-appearing, AAOx3   Abd: Soft, gravid  Ext: non-tender, non-edematous  SVE:  4/80/-2  Bedside sono: cephalic, posterior placenta  FHT: 125bpm  baseline, moderate variability, +acels, no decels  [place]:  q5-8m

## 2022-11-08 NOTE — OB PROVIDER LABOR PROGRESS NOTE - NS_OBIHIFHRDETAILS_OBGYN_ALL_OB_FT
135bpm baseline, moderate variability, + acels, no decels
baseline 120, moderate variability, +accels, -decels
cat II with variable decel x1
150 bpm, moderate variability + accels no decels present

## 2022-11-08 NOTE — OB PROVIDER H&P - HISTORY OF PRESENT ILLNESS
32y  at 37w1d GA by LMP consistent with first trimester moise who presents to L&D for elevated BPs at home in the 150s/90s. She was seen in the office yesterday and had 150s/90s BPs an was told to monitor and come in if elevated again. She reports an associated 6/10 new onset headache this morning; has not taken anything for pain. Denies blurred vision, RUQ or epigastric pain, SOB. She is also having 3/10 contraction pain every 15-20m. She was checked in the office yesterday and was 2/50 dilated. Patient denies vaginal bleeding and leakage of fluid. She endorses good fetal movement. Denies fevers, chills, nausea, vomiting, chest pain, dizziness. No other complaints at this time.   TARAS: 22  LMP: 22  Prenatal course is significant for: cHTN      POB:   2018 - full term - c/b GDMA2 on insulin and PEC  SAB 2016 - D&C  PGYN: -fibroids, -ovarian cysts, denies STD hx, denies abnormal PAPs   PMH: cHTN  PSH: tonsillectomy, adenoidectomy, D&C  SH: Denies EtOH, use during this pregnancy; use of cigarettes and cannabis in 1st trimester - quit once found out pregnancy; feels safe at home   Meds: PNVs  Allergies: NKDA

## 2022-11-08 NOTE — OB RN TRIAGE NOTE - NS_PAINMANGEPLANS_OBGYN_ALL_OB
Breathing/Relaxation/Epidural Cellcept Counseling:  I discussed with the patient the risks of mycophenolate mofetil including but not limited to infection/immunosuppression, GI upset, hypokalemia, hypercholesterolemia, bone marrow suppression, lymphoproliferative disorders, malignancy, GI ulceration/bleed/perforation, colitis, interstitial lung disease, kidney failure, progressive multifocal leukoencephalopathy, and birth defects.  The patient understands that monitoring is required including a baseline creatinine and regular CBC testing. In addition, patient must alert us immediately if symptoms of infection or other concerning signs are noted. Arava Pregnancy And Lactation Text: This medication is Pregnancy Category X and is absolutely contraindicated during pregnancy. It is unknown if it is excreted in breast milk. Hydroxychloroquine Pregnancy And Lactation Text: This medication has been shown to cause fetal harm but it isn't assigned a Pregnancy Risk Category. There are small amounts excreted in breast milk. Quinolones Counseling:  I discussed with the patient the risks of fluoroquinolones including but not limited to GI upset, allergic reaction, drug rash, diarrhea, dizziness, photosensitivity, yeast infections, liver function test abnormalities, tendonitis/tendon rupture. Thalidomide Counseling: I discussed with the patient the risks of thalidomide including but not limited to birth defects, anxiety, weakness, chest pain, dizziness, cough and severe allergy. Nsaids Counseling: NSAID Counseling: I discussed with the patient that NSAIDs should be taken with food. Prolonged use of NSAIDs can result in the development of stomach ulcers.  Patient advised to stop taking NSAIDs if abdominal pain occurs.  The patient verbalized understanding of the proper use and possible adverse effects of NSAIDs.  All of the patient's questions and concerns were addressed. High Dose Vitamin A Pregnancy And Lactation Text: High dose vitamin A therapy is contraindicated during pregnancy and breast feeding. Terbinafine Pregnancy And Lactation Text: This medication is Pregnancy Category B and is considered safe during pregnancy. It is also excreted in breast milk and breast feeding isn't recommended. Hydroquinone Counseling:  Patient advised that medication may result in skin irritation, lightening (hypopigmentation), dryness, and burning.  In the event of skin irritation, the patient was advised to reduce the amount of the drug applied or use it less frequently.  Rarely, spots that are treated with hydroquinone can become darker (pseudoochronosis).  Should this occur, patient instructed to stop medication and call the office. The patient verbalized understanding of the proper use and possible adverse effects of hydroquinone.  All of the patient's questions and concerns were addressed. Sski Pregnancy And Lactation Text: This medication is Pregnancy Category D and isn't considered safe during pregnancy. It is excreted in breast milk. Siliq Counseling:  I discussed with the patient the risks of Siliq including but not limited to new or worsening depression, suicidal thoughts and behavior, immunosuppression, malignancy, posterior leukoencephalopathy syndrome, and serious infections.  The patient understands that monitoring is required including a PPD at baseline and must alert us or the primary physician if symptoms of infection or other concerning signs are noted. There is also a special program designed to monitor depression which is required with Siliq. Arava Counseling:  Patient counseled regarding adverse effects of Arava including but not limited to nausea, vomiting, abnormalities in liver function tests. Patients may develop mouth sores, rash, diarrhea, and abnormalities in blood counts. The patient understands that monitoring is required including LFTs and blood counts.  There is a rare possibility of scarring of the liver and lung problems that can occur when taking methotrexate. Persistent nausea, loss of appetite, pale stools, dark urine, cough, and shortness of breath should be reported immediately. Patient advised to discontinue Arava treatment and consult with a physician prior to attempting conception. The patient will have to undergo a treatment to eliminate Arava from the body prior to conception. Tazorac Counseling:  Patient advised that medication is irritating and drying.  Patient may need to apply sparingly and wash off after an hour before eventually leaving it on overnight.  The patient verbalized understanding of the proper use and possible adverse effects of tazorac.  All of the patient's questions and concerns were addressed. Azathioprine Pregnancy And Lactation Text: This medication is Pregnancy Category D and isn't considered safe during pregnancy. It is unknown if this medication is excreted in breast milk. Clofazimine Counseling:  I discussed with the patient the risks of clofazimine including but not limited to skin and eye pigmentation, liver damage, nausea/vomiting, gastrointestinal bleeding and allergy. Cimetidine Counseling:  I discussed with the patient the risks of Cimetidine including but not limited to gynecomastia, headache, diarrhea, nausea, drowsiness, arrhythmias, pancreatitis, skin rashes, psychosis, bone marrow suppression and kidney toxicity. Nsaids Pregnancy And Lactation Text: These medications are considered safe up to 30 weeks gestation. It is excreted in breast milk. Siliq Pregnancy And Lactation Text: The risk during pregnancy and breastfeeding is uncertain with this medication. Libtayo Counseling- I discussed with the patient the risks of Libtayo including but not limited to nausea, vomiting, diarrhea, and bone or muscle pain.  The patient verbalized understanding of the proper use and possible adverse effects of Libtayo.  All of the patient's questions and concerns were addressed. Tazorac Pregnancy And Lactation Text: This medication is not safe during pregnancy. It is unknown if this medication is excreted in breast milk. Quinolones Pregnancy And Lactation Text: This medication is Pregnancy Category C and it isn't know if it is safe during pregnancy. It is also excreted in breast milk. Hydroquinone Pregnancy And Lactation Text: This medication has not been assigned a Pregnancy Risk Category but animal studies failed to show danger with the topical medication. It is unknown if the medication is excreted in breast milk. Topical Clindamycin Pregnancy And Lactation Text: This medication is Pregnancy Category B and is considered safe during pregnancy. It is unknown if it is excreted in breast milk. Azithromycin Counseling:  I discussed with the patient the risks of azithromycin including but not limited to GI upset, allergic reaction, drug rash, diarrhea, and yeast infections. Tranexamic Acid Counseling:  Patient advised of the small risk of bleeding problems with tranexamic acid. They were also instructed to call if they developed any nausea, vomiting or diarrhea. All of the patient's questions and concerns were addressed. Clofazimine Pregnancy And Lactation Text: This medication is Pregnancy Category C and isn't considered safe during pregnancy. It is excreted in breast milk. Odomzo Counseling- I discussed with the patient the risks of Odomzo including but not limited to nausea, vomiting, diarrhea, constipation, weight loss, changes in the sense of taste, decreased appetite, muscle spasms, and hair loss.  The patient verbalized understanding of the proper use and possible adverse effects of Odomzo.  All of the patient's questions and concerns were addressed. Cyclophosphamide Counseling:  I discussed with the patient the risks of cyclophosphamide including but not limited to hair loss, hormonal abnormalities, decreased fertility, abdominal pain, diarrhea, nausea and vomiting, bone marrow suppression and infection. The patient understands that monitoring is required while taking this medication. Azelaic Acid Counseling: Patient counseled that medicine may cause skin irritation and to avoid applying near the eyes.  In the event of skin irritation, the patient was advised to reduce the amount of the drug applied or use it less frequently.   The patient verbalized understanding of the proper use and possible adverse effects of azelaic acid.  All of the patient's questions and concerns were addressed. Libtayo Pregnancy And Lactation Text: This medication is contraindicated in pregnancy and when breast feeding. Topical Clindamycin Counseling: Patient counseled that this medication may cause skin irritation or allergic reactions.  In the event of skin irritation, the patient was advised to reduce the amount of the drug applied or use it less frequently.   The patient verbalized understanding of the proper use and possible adverse effects of clindamycin.  All of the patient's questions and concerns were addressed. Simponi Counseling:  I discussed with the patient the risks of golimumab including but not limited to myelosuppression, immunosuppression, autoimmune hepatitis, demyelinating diseases, lymphoma, and serious infections.  The patient understands that monitoring is required including a PPD at baseline and must alert us or the primary physician if symptoms of infection or other concerning signs are noted. Imiquimod Counseling:  I discussed with the patient the risks of imiquimod including but not limited to erythema, scaling, itching, weeping, crusting, and pain.  Patient understands that the inflammatory response to imiquimod is variable from person to person and was educated regarded proper titration schedule.  If flu-like symptoms develop, patient knows to discontinue the medication and contact us. Rifampin Counseling: I discussed with the patient the risks of rifampin including but not limited to liver damage, kidney damage, red-orange body fluids, nausea/vomiting and severe allergy. Skyrizi Counseling: I discussed with the patient the risks of risankizumab-rzaa including but not limited to immunosuppression, and serious infections.  The patient understands that monitoring is required including a PPD at baseline and must alert us or the primary physician if symptoms of infection or other concerning signs are noted. Niacinamide Pregnancy And Lactation Text: These medications are considered safe during pregnancy. Sarecycline Counseling: Patient advised regarding possible photosensitivity and discoloration of the teeth, skin, lips, tongue and gums.  Patient instructed to avoid sunlight, if possible.  When exposed to sunlight, patients should wear protective clothing, sunglasses, and sunscreen.  The patient was instructed to call the office immediately if the following severe adverse effects occur:  hearing changes, easy bruising/bleeding, severe headache, or vision changes.  The patient verbalized understanding of the proper use and possible adverse effects of sarecycline.  All of the patient's questions and concerns were addressed. Doxepin Counseling:  Patient advised that the medication is sedating and not to drive a car after taking this medication. Patient informed of potential adverse effects including but not limited to dry mouth, urinary retention, and blurry vision.  The patient verbalized understanding of the proper use and possible adverse effects of doxepin.  All of the patient's questions and concerns were addressed. Topical Ketoconazole Counseling: Patient counseled that this medication may cause skin irritation or allergic reactions.  In the event of skin irritation, the patient was advised to reduce the amount of the drug applied or use it less frequently.   The patient verbalized understanding of the proper use and possible adverse effects of ketoconazole.  All of the patient's questions and concerns were addressed. Cyclophosphamide Pregnancy And Lactation Text: This medication is Pregnancy Category D and it isn't considered safe during pregnancy. This medication is excreted in breast milk. Colchicine Counseling:  Patient counseled regarding adverse effects including but not limited to stomach upset (nausea, vomiting, stomach pain, or diarrhea).  Patient instructed to limit alcohol consumption while taking this medication.  Colchicine may reduce blood counts especially with prolonged use.  The patient understands that monitoring of kidney function and blood counts may be required, especially at baseline. The patient verbalized understanding of the proper use and possible adverse effects of colchicine.  All of the patient's questions and concerns were addressed. Rifampin Pregnancy And Lactation Text: This medication is Pregnancy Category C and it isn't know if it is safe during pregnancy. It is also excreted in breast milk and should not be used if you are breast feeding. Azithromycin Pregnancy And Lactation Text: This medication is considered safe during pregnancy and is also secreted in breast milk. Imiquimod Pregnancy And Lactation Text: This medication is Pregnancy Category C. It is unknown if this medication is excreted in breast milk. Tranexamic Acid Pregnancy And Lactation Text: It is unknown if this medication is safe during pregnancy or breast feeding. Azelaic Acid Pregnancy And Lactation Text: This medication is considered safe during pregnancy and breast feeding. Niacinamide Counseling: I recommended taking niacin or niacinamide, also know as vitamin B3, twice daily. Recent evidence suggests that taking vitamin B3 (500 mg twice daily) can reduce the risk of actinic keratoses and non-melanoma skin cancers. Side effects of vitamin B3 include flushing and headache. Cosentyx Pregnancy And Lactation Text: This medication is Pregnancy Category B and is considered safe during pregnancy. It is unknown if this medication is excreted in breast milk. Valtrex Pregnancy And Lactation Text: this medication is Pregnancy Category B and is considered safe during pregnancy. This medication is not directly found in breast milk but it's metabolite acyclovir is present. Sarecycline Pregnancy And Lactation Text: This medication is Pregnancy Category D and not consider safe during pregnancy. It is also excreted in breast milk. Benzoyl Peroxide Counseling: Patient counseled that medicine may cause skin irritation and bleach clothing.  In the event of skin irritation, the patient was advised to reduce the amount of the drug applied or use it less frequently.   The patient verbalized understanding of the proper use and possible adverse effects of benzoyl peroxide.  All of the patient's questions and concerns were addressed. Cyclosporine Counseling:  I discussed with the patient the risks of cyclosporine including but not limited to hypertension, gingival hyperplasia,myelosuppression, immunosuppression, liver damage, kidney damage, neurotoxicity, lymphoma, and serious infections. The patient understands that monitoring is required including baseline blood pressure, CBC, CMP, lipid panel and uric acid, and then 1-2 times monthly CMP and blood pressure. Valtrex Counseling: I discussed with the patient the risks of valacyclovir including but not limited to kidney damage, nausea, vomiting and severe allergy.  The patient understands that if the infection seems to be worsening or is not improving, they are to call. Doxepin Pregnancy And Lactation Text: This medication is Pregnancy Category C and it isn't known if it is safe during pregnancy. It is also excreted in breast milk and breast feeding isn't recommended. Oral Minoxidil Counseling- I discussed with the patient the risks of oral minoxidil including but not limited to shortness of breath, swelling of the feet or ankles, dizziness, lightheadedness, unwanted hair growth and allergic reaction.  The patient verbalized understanding of the proper use and possible adverse effects of oral minoxidil.  All of the patient's questions and concerns were addressed. Minoxidil Counseling: Minoxidil is a topical medication which can increase blood flow where it is applied. It is uncertain how this medication increases hair growth. Side effects are uncommon and include stinging and allergic reactions. Bactrim Counseling:  I discussed with the patient the risks of sulfa antibiotics including but not limited to GI upset, allergic reaction, drug rash, diarrhea, dizziness, photosensitivity, and yeast infections.  Rarely, more serious reactions can occur including but not limited to aplastic anemia, agranulocytosis, methemoglobinemia, blood dyscrasias, liver or kidney failure, lung infiltrates or desquamative/blistering drug rashes. Cephalexin Counseling: I counseled the patient regarding use of cephalexin as an antibiotic for prophylactic and/or therapeutic purposes. Cephalexin (commonly prescribed under brand name Keflex) is a cephalosporin antibiotic which is active against numerous classes of bacteria, including most skin bacteria. Side effects may include nausea, diarrhea, gastrointestinal upset, rash, hives, yeast infections, and in rare cases, hepatitis, kidney disease, seizures, fever, confusion, neurologic symptoms, and others. Patients with severe allergies to penicillin medications are cautioned that there is about a 10% incidence of cross-reactivity with cephalosporins. When possible, patients with penicillin allergies should use alternatives to cephalosporins for antibiotic therapy. Dapsone Pregnancy And Lactation Text: This medication is Pregnancy Category C and is not considered safe during pregnancy or breast feeding. Tetracycline Counseling: Patient counseled regarding possible photosensitivity and increased risk for sunburn.  Patient instructed to avoid sunlight, if possible.  When exposed to sunlight, patients should wear protective clothing, sunglasses, and sunscreen.  The patient was instructed to call the office immediately if the following severe adverse effects occur:  hearing changes, easy bruising/bleeding, severe headache, or vision changes.  The patient verbalized understanding of the proper use and possible adverse effects of tetracycline.  All of the patient's questions and concerns were addressed. Patient understands to avoid pregnancy while on therapy due to potential birth defects. Cyclosporine Pregnancy And Lactation Text: This medication is Pregnancy Category C and it isn't know if it is safe during pregnancy. This medication is excreted in breast milk. Hydroxyzine Counseling: Patient advised that the medication is sedating and not to drive a car after taking this medication.  Patient informed of potential adverse effects including but not limited to dry mouth, urinary retention, and blurry vision.  The patient verbalized understanding of the proper use and possible adverse effects of hydroxyzine.  All of the patient's questions and concerns were addressed. Mirvaso Counseling: Mirvaso is a topical medication which can decrease superficial blood flow where applied. Side effects are uncommon and include stinging, redness and allergic reactions. Dupixent Counseling: I discussed with the patient the risks of dupilumab including but not limited to eye infection and irritation, cold sores, injection site reactions, worsening of asthma, allergic reactions and increased risk of parasitic infection.  Live vaccines should be avoided while taking dupilumab. Dupilumab will also interact with certain medications such as warfarin and cyclosporine. The patient understands that monitoring is required and they must alert us or the primary physician if symptoms of infection or other concerning signs are noted. Stelara Counseling:  I discussed with the patient the risks of ustekinumab including but not limited to immunosuppression, malignancy, posterior leukoencephalopathy syndrome, and serious infections.  The patient understands that monitoring is required including a PPD at baseline and must alert us or the primary physician if symptoms of infection or other concerning signs are noted. Topical Sulfur Applications Counseling: Topical Sulfur Counseling: Patient counseled that this medication may cause skin irritation or allergic reactions.  In the event of skin irritation, the patient was advised to reduce the amount of the drug applied or use it less frequently.   The patient verbalized understanding of the proper use and possible adverse effects of topical sulfur application.  All of the patient's questions and concerns were addressed. Bactrim Pregnancy And Lactation Text: This medication is Pregnancy Category D and is known to cause fetal risk.  It is also excreted in breast milk. Dapsone Counseling: I discussed with the patient the risks of dapsone including but not limited to hemolytic anemia, agranulocytosis, rashes, methemoglobinemia, kidney failure, peripheral neuropathy, headaches, GI upset, and liver toxicity.  Patients who start dapsone require monitoring including baseline LFTs and weekly CBCs for the first month, then every month thereafter.  The patient verbalized understanding of the proper use and possible adverse effects of dapsone.  All of the patient's questions and concerns were addressed. Benzoyl Peroxide Pregnancy And Lactation Text: This medication is Pregnancy Category C. It is unknown if benzoyl peroxide is excreted in breast milk. Oral Minoxidil Pregnancy And Lactation Text: This medication should only be used when clearly needed if you are pregnant, attempting to become pregnant or breast feeding. Carac Pregnancy And Lactation Text: This medication is Pregnancy Category X and contraindicated in pregnancy and in women who may become pregnant. It is unknown if this medication is excreted in breast milk. Use Enhanced Medication Counseling?: No Otezla Pregnancy And Lactation Text: This medication is Pregnancy Category C and it isn't known if it is safe during pregnancy. It is unknown if it is excreted in breast milk. Mirvaso Pregnancy And Lactation Text: This medication has not been assigned a Pregnancy Risk Category. It is unknown if the medication is excreted in breast milk. Dupixent Pregnancy And Lactation Text: This medication likely crosses the placenta but the risk for the fetus is uncertain. This medication is excreted in breast milk. Cephalexin Pregnancy And Lactation Text: This medication is Pregnancy Category B and considered safe during pregnancy.  It is also excreted in breast milk but can be used safely for shorter doses. Methotrexate Counseling:  Patient counseled regarding adverse effects of methotrexate including but not limited to nausea, vomiting, abnormalities in liver function tests. Patients may develop mouth sores, rash, diarrhea, and abnormalities in blood counts. The patient understands that monitoring is required including LFT's and blood counts.  There is a rare possibility of scarring of the liver and lung problems that can occur when taking methotrexate. Persistent nausea, loss of appetite, pale stools, dark urine, cough, and shortness of breath should be reported immediately. Patient advised to discontinue methotrexate treatment at least three months before attempting to become pregnant.  I discussed the need for folate supplements while taking methotrexate.  These supplements can decrease side effects during methotrexate treatment. The patient verbalized understanding of the proper use and possible adverse effects of methotrexate.  All of the patient's questions and concerns were addressed. Otezla Counseling: The side effects of Otezla were discussed with the patient, including but not limited to worsening or new depression, weight loss, diarrhea, nausea, upper respiratory tract infection, and headache. Patient instructed to call the office should any adverse effect occur.  The patient verbalized understanding of the proper use and possible adverse effects of Otezla.  All the patient's questions and concerns were addressed. Hydroxyzine Pregnancy And Lactation Text: This medication is not safe during pregnancy and should not be taken. It is also excreted in breast milk and breast feeding isn't recommended. Carac Counseling:  I discussed with the patient the risks of Carac including but not limited to erythema, scaling, itching, weeping, crusting, and pain. Topical Sulfur Applications Pregnancy And Lactation Text: This medication is considered safe during pregnancy and breast feeding secondary to limited systemic absorption. Enbrel Counseling:  I discussed with the patient the risks of etanercept including but not limited to myelosuppression, immunosuppression, autoimmune hepatitis, demyelinating diseases, lymphoma, and infections.  The patient understands that monitoring is required including a PPD at baseline and must alert us or the primary physician if symptoms of infection or other concerning signs are noted. Dutasteride Pregnancy And Lactation Text: This medication is absolutely contraindicated in women, especially during pregnancy and breast feeding. Feminization of male fetuses is possible if taking while pregnant. Prednisone Counseling:  I discussed with the patient the risks of prolonged use of prednisone including but not limited to weight gain, insomnia, osteoporosis, mood changes, diabetes, susceptibility to infection, glaucoma and high blood pressure.  In cases where prednisone use is prolonged, patients should be monitored with blood pressure checks, serum glucose levels and an eye exam.  Additionally, the patient may need to be placed on GI prophylaxis, PCP prophylaxis, and calcium and vitamin D supplementation and/or a bisphosphonate.  The patient verbalized understanding of the proper use and the possible adverse effects of prednisone.  All of the patient's questions and concerns were addressed. Clindamycin Counseling: I counseled the patient regarding use of clindamycin as an antibiotic for prophylactic and/or therapeutic purposes. Clindamycin is active against numerous classes of bacteria, including skin bacteria. Side effects may include nausea, diarrhea, gastrointestinal upset, rash, hives, yeast infections, and in rare cases, colitis. Detail Level: Zone Calcipotriene Counseling:  I discussed with the patient the risks of calcipotriene including but not limited to erythema, scaling, itching, and irritation. Oxybutynin Counseling:  I discussed with the patient the risks of oxybutynin including but not limited to skin rash, drowsiness, dry mouth, difficulty urinating, and blurred vision. Picato Counseling:  I discussed with the patient the risks of Picato including but not limited to erythema, scaling, itching, weeping, crusting, and pain. Taltz Counseling: I discussed with the patient the risks of ixekizumab including but not limited to immunosuppression, serious infections, worsening of inflammatory bowel disease and drug reactions.  The patient understands that monitoring is required including a PPD at baseline and must alert us or the primary physician if symptoms of infection or other concerning signs are noted. Dutasteride Male Counseling: Dustasteride Counseling:  I discussed with the patient the risks of use of dutasteride including but not limited to decreased libido, decreased ejaculate volume, and gynecomastia. Women who can become pregnant should not handle medication.  All of the patient's questions and concerns were addressed. Wartpeel Counseling:  I discussed with the patient the risks of Wartpeel including but not limited to erythema, scaling, itching, weeping, crusting, and pain. Methotrexate Pregnancy And Lactation Text: This medication is Pregnancy Category X and is known to cause fetal harm. This medication is excreted in breast milk. Albendazole Pregnancy And Lactation Text: This medication is Pregnancy Category C and it isn't known if it is safe during pregnancy. It is also excreted in breast milk. Erivedge Counseling- I discussed with the patient the risks of Erivedge including but not limited to nausea, vomiting, diarrhea, constipation, weight loss, changes in the sense of taste, decreased appetite, muscle spasms, and hair loss.  The patient verbalized understanding of the proper use and possible adverse effects of Erivedge.  All of the patient's questions and concerns were addressed. Calcipotriene Pregnancy And Lactation Text: This medication has not been proven safe during pregnancy. It is unknown if this medication is excreted in breast milk. Clindamycin Pregnancy And Lactation Text: This medication can be used in pregnancy if certain situations. Clindamycin is also present in breast milk. Fluconazole Counseling:  Patient counseled regarding adverse effects of fluconazole including but not limited to headache, diarrhea, nausea, upset stomach, liver function test abnormalities, taste disturbance, and stomach pain.  There is a rare possibility of liver failure that can occur when taking fluconazole.  The patient understands that monitoring of LFTs and kidney function test may be required, especially at baseline. The patient verbalized understanding of the proper use and possible adverse effects of fluconazole.  All of the patient's questions and concerns were addressed. Albendazole Counseling:  I discussed with the patient the risks of albendazole including but not limited to cytopenia, kidney damage, nausea/vomiting and severe allergy.  The patient understands that this medication is being used in an off-label manner. Ivermectin Counseling:  Patient instructed to take medication on an empty stomach with a full glass of water.  Patient informed of potential adverse effects including but not limited to nausea, diarrhea, dizziness, itching, and swelling of the extremities or lymph nodes.  The patient verbalized understanding of the proper use and possible adverse effects of ivermectin.  All of the patient's questions and concerns were addressed. Winlevi Pregnancy And Lactation Text: This medication is considered safe during pregnancy and breastfeeding. Doxycycline Counseling:  Patient counseled regarding possible photosensitivity and increased risk for sunburn.  Patient instructed to avoid sunlight, if possible.  When exposed to sunlight, patients should wear protective clothing, sunglasses, and sunscreen.  The patient was instructed to call the office immediately if the following severe adverse effects occur:  hearing changes, easy bruising/bleeding, severe headache, or vision changes.  The patient verbalized understanding of the proper use and possible adverse effects of doxycycline.  All of the patient's questions and concerns were addressed. Propranolol Counseling:  I discussed with the patient the risks of propranolol including but not limited to low heart rate, low blood pressure, low blood sugar, restlessness and increased cold sensitivity. They should call the office if they experience any of these side effects. 5-Fu Counseling: 5-Fluorouracil Counseling:  I discussed with the patient the risks of 5-fluorouracil including but not limited to erythema, scaling, itching, weeping, crusting, and pain. Humira Counseling:  I discussed with the patient the risks of adalimumab including but not limited to myelosuppression, immunosuppression, autoimmune hepatitis, demyelinating diseases, lymphoma, and serious infections.  The patient understands that monitoring is required including a PPD at baseline and must alert us or the primary physician if symptoms of infection or other concerning signs are noted. Winlevi Counseling:  I discussed with the patient the risks of topical clascoterone including but not limited to erythema, scaling, itching, and stinging. Patient voiced their understanding. Protopic Counseling: Patient may experience a mild burning sensation during topical application. Protopic is not approved in children less than 2 years of age. There have been case reports of hematologic and skin malignancies in patients using topical calcineurin inhibitors although causality is questionable. Tremfya Counseling: I discussed with the patient the risks of guselkumab including but not limited to immunosuppression, serious infections, and drug reactions.  The patient understands that monitoring is required including a PPD at baseline and must alert us or the primary physician if symptoms of infection or other concerning signs are noted. Xeljanz Counseling: I discussed with the patient the risks of Xeljanz therapy including increased risk of infection, liver issues, headache, diarrhea, or cold symptoms. Live vaccines should be avoided. They were instructed to call if they have any problems. Zyclara Counseling:  I discussed with the patient the risks of imiquimod including but not limited to erythema, scaling, itching, weeping, crusting, and pain.  Patient understands that the inflammatory response to imiquimod is variable from person to person and was educated regarded proper titration schedule.  If flu-like symptoms develop, patient knows to discontinue the medication and contact us. Propranolol Pregnancy And Lactation Text: This medication is Pregnancy Category C and it isn't known if it is safe during pregnancy. It is excreted in breast milk. Griseofulvin Pregnancy And Lactation Text: This medication is Pregnancy Category X and is known to cause serious birth defects. It is unknown if this medication is excreted in breast milk but breast feeding should be avoided. Finasteride Male Counseling: Finasteride Counseling:  I discussed with the patient the risks of use of finasteride including but not limited to decreased libido, decreased ejaculate volume, gynecomastia, and depression. Women should not handle medication.  All of the patient's questions and concerns were addressed. Doxycycline Pregnancy And Lactation Text: This medication is Pregnancy Category D and not consider safe during pregnancy. It is also excreted in breast milk but is considered safe for shorter treatment courses. Acitretin Counseling:  I discussed with the patient the risks of acitretin including but not limited to hair loss, dry lips/skin/eyes, liver damage, hyperlipidemia, depression/suicidal ideation, photosensitivity.  Serious rare side effects can include but are not limited to pancreatitis, pseudotumor cerebri, bony changes, clot formation/stroke/heart attack.  Patient understands that alcohol is contraindicated since it can result in liver toxicity and significantly prolong the elimination of the drug by many years. Griseofulvin Counseling:  I discussed with the patient the risks of griseofulvin including but not limited to photosensitivity, cytopenia, liver damage, nausea/vomiting and severe allergy.  The patient understands that this medication is best absorbed when taken with a fatty meal (e.g., ice cream or french fries). Protopic Pregnancy And Lactation Text: This medication is Pregnancy Category C. It is unknown if this medication is excreted in breast milk when applied topically. Xeljessaz Pregnancy And Lactation Text: This medication is Pregnancy Category D and is not considered safe during pregnancy.  The risk during breast feeding is also uncertain. Itraconazole Counseling:  I discussed with the patient the risks of itraconazole including but not limited to liver damage, nausea/vomiting, neuropathy, and severe allergy.  The patient understands that this medication is best absorbed when taken with acidic beverages such as non-diet cola or ginger ale.  The patient understands that monitoring is required including baseline LFTs and repeat LFTs at intervals.  The patient understands that they are to contact us or the primary physician if concerning signs are noted. Acitretin Pregnancy And Lactation Text: This medication is Pregnancy Category X and should not be given to women who are pregnant or may become pregnant in the future. This medication is excreted in breast milk. Finasteride Pregnancy And Lactation Text: This medication is absolutely contraindicated during pregnancy. It is unknown if it is excreted in breast milk. Drysol Counseling:  I discussed with the patient the risks of drysol/aluminum chloride including but not limited to skin rash, itching, irritation, burning. Rhofade Counseling: Rhofade is a topical medication which can decrease superficial blood flow where applied. Side effects are uncommon and include stinging, redness and allergic reactions. Birth Control Pills Counseling: Birth Control Pill Counseling: I discussed with the patient the potential side effects of OCPs including but not limited to increased risk of stroke, heart attack, thrombophlebitis, deep venous thrombosis, hepatic adenomas, breast changes, GI upset, headaches, and depression.  The patient verbalized understanding of the proper use and possible adverse effects of OCPs. All of the patient's questions and concerns were addressed. Ilumya Counseling: I discussed with the patient the risks of tildrakizumab including but not limited to immunosuppression, malignancy, posterior leukoencephalopathy syndrome, and serious infections.  The patient understands that monitoring is required including a PPD at baseline and must alert us or the primary physician if symptoms of infection or other concerning signs are noted. Erythromycin Counseling:  I discussed with the patient the risks of erythromycin including but not limited to GI upset, allergic reaction, drug rash, diarrhea, increase in liver enzymes, and yeast infections. Infliximab Counseling:  I discussed with the patient the risks of infliximab including but not limited to myelosuppression, immunosuppression, autoimmune hepatitis, demyelinating diseases, lymphoma, and serious infections.  The patient understands that monitoring is required including a PPD at baseline and must alert us or the primary physician if symptoms of infection or other concerning signs are noted. Metronidazole Counseling:  I discussed with the patient the risks of metronidazole including but not limited to seizures, nausea/vomiting, a metallic taste in the mouth, nausea/vomiting and severe allergy. Bexarotene Counseling:  I discussed with the patient the risks of bexarotene including but not limited to hair loss, dry lips/skin/eyes, liver abnormalities, hyperlipidemia, pancreatitis, depression/suicidal ideation, photosensitivity, drug rash/allergic reactions, hypothyroidism, anemia, leukopenia, infection, cataracts, and teratogenicity.  Patient understands that they will need regular blood tests to check lipid profile, liver function tests, white blood cell count, thyroid function tests and pregnancy test if applicable. Solaraze Counseling:  I discussed with the patient the risks of Solaraze including but not limited to erythema, scaling, itching, weeping, crusting, and pain. Xolair Counseling:  Patient informed of potential adverse effects including but not limited to fever, muscle aches, rash and allergic reactions.  The patient verbalized understanding of the proper use and possible adverse effects of Xolair.  All of the patient's questions and concerns were addressed. Erythromycin Pregnancy And Lactation Text: This medication is Pregnancy Category B and is considered safe during pregnancy. It is also excreted in breast milk. Birth Control Pills Pregnancy And Lactation Text: This medication should be avoided if pregnant and for the first 30 days post-partum. Gabapentin Counseling: I discussed with the patient the risks of gabapentin including but not limited to dizziness, somnolence, fatigue and ataxia. Ketoconazole Counseling:   Patient counseled regarding improving absorption with orange juice.  Adverse effects include but are not limited to breast enlargement, headache, diarrhea, nausea, upset stomach, liver function test abnormalities, taste disturbance, and stomach pain.  There is a rare possibility of liver failure that can occur when taking ketoconazole. The patient understands that monitoring of LFTs may be required, especially at baseline. The patient verbalized understanding of the proper use and possible adverse effects of ketoconazole.  All of the patient's questions and concerns were addressed. Glycopyrrolate Counseling:  I discussed with the patient the risks of glycopyrrolate including but not limited to skin rash, drowsiness, dry mouth, difficulty urinating, and blurred vision. Spironolactone Pregnancy And Lactation Text: This medication can cause feminization of the male fetus and should be avoided during pregnancy. The active metabolite is also found in breast milk. Solaraze Pregnancy And Lactation Text: This medication is Pregnancy Category B and is considered safe. There is some data to suggest avoiding during the third trimester. It is unknown if this medication is excreted in breast milk. Metronidazole Pregnancy And Lactation Text: This medication is Pregnancy Category B and considered safe during pregnancy.  It is also excreted in breast milk. Bexarotene Pregnancy And Lactation Text: This medication is Pregnancy Category X and should not be given to women who are pregnant or may become pregnant. This medication should not be used if you are breast feeding. Spironolactone Counseling: Patient advised regarding risks of diarrhea, abdominal pain, hyperkalemia, birth defects (for female patients), liver toxicity and renal toxicity. The patient may need blood work to monitor liver and kidney function and potassium levels while on therapy. The patient verbalized understanding of the proper use and possible adverse effects of spironolactone.  All of the patient's questions and concerns were addressed. Xolair Pregnancy And Lactation Text: This medication is Pregnancy Category B and is considered safe during pregnancy. This medication is excreted in breast milk. Elidel Counseling: Patient may experience a mild burning sensation during topical application. Elidel is not approved in children less than 2 years of age. There have been case reports of hematologic and skin malignancies in patients using topical calcineurin inhibitors although causality is questionable. Opioid Counseling: I discussed with the patient the potential side effects of opioids including but not limited to addiction, altered mental status, and depression. I stressed avoiding alcohol, benzodiazepines, muscle relaxants and sleep aids unless specifically okayed by a physician. The patient verbalized understanding of the proper use and possible adverse effects of opioids. All of the patient's questions and concerns were addressed. They were instructed to flush the remaining pills down the toilet if they did not need them for pain. Glycopyrrolate Pregnancy And Lactation Text: This medication is Pregnancy Category B and is considered safe during pregnancy. It is unknown if it is excreted breast milk. Minocycline Counseling: Patient advised regarding possible photosensitivity and discoloration of the teeth, skin, lips, tongue and gums.  Patient instructed to avoid sunlight, if possible.  When exposed to sunlight, patients should wear protective clothing, sunglasses, and sunscreen.  The patient was instructed to call the office immediately if the following severe adverse effects occur:  hearing changes, easy bruising/bleeding, severe headache, or vision changes.  The patient verbalized understanding of the proper use and possible adverse effects of minocycline.  All of the patient's questions and concerns were addressed. Ketoconazole Pregnancy And Lactation Text: This medication is Pregnancy Category C and it isn't know if it is safe during pregnancy. It is also excreted in breast milk and breast feeding isn't recommended. Eucrisa Counseling: Patient may experience a mild burning sensation during topical application. Eucrisa is not approved in children less than 2 years of age. SSKI Counseling:  I discussed with the patient the risks of SSKI including but not limited to thyroid abnormalities, metallic taste, GI upset, fever, headache, acne, arthralgias, paraesthesias, lymphadenopathy, easy bleeding, arrhythmias, and allergic reaction. Cimzia Counseling:  I discussed with the patient the risks of Cimzia including but not limited to immunosuppression, allergic reactions and infections.  The patient understands that monitoring is required including a PPD at baseline and must alert us or the primary physician if symptoms of infection or other concerning signs are noted. Rituxan Counseling:  I discussed with the patient the risks of Rituxan infusions. Side effects can include infusion reactions, severe drug rashes including mucocutaneous reactions, reactivation of latent hepatitis and other infections and rarely progressive multifocal leukoencephalopathy.  All of the patient's questions and concerns were addressed. Topical Retinoid counseling:  Patient advised to apply a pea-sized amount only at bedtime and wait 30 minutes after washing their face before applying.  If too drying, patient may add a non-comedogenic moisturizer. The patient verbalized understanding of the proper use and possible adverse effects of retinoids.  All of the patient's questions and concerns were addressed. Opioid Pregnancy And Lactation Text: These medications can lead to premature delivery and should be avoided during pregnancy. These medications are also present in breast milk in small amounts. Isotretinoin Counseling: Patient should get monthly blood tests, not donate blood, not drive at night if vision affected, not share medication, and not undergo elective surgery for 6 months after tx completed. Side effects reviewed, pt to contact office should one occur. High Dose Vitamin A Counseling: Side effects reviewed, pt to contact office should one occur. Hydroxychloroquine Counseling:  I discussed with the patient that a baseline ophthalmologic exam is needed at the start of therapy and every year thereafter while on therapy. A CBC may also be warranted for monitoring.  The side effects of this medication were discussed with the patient, including but not limited to agranulocytosis, aplastic anemia, seizures, rashes, retinopathy, and liver toxicity. Patient instructed to call the office should any adverse effect occur.  The patient verbalized understanding of the proper use and possible adverse effects of Plaquenil.  All the patient's questions and concerns were addressed. Cosentyx Counseling:  I discussed with the patient the risks of Cosentyx including but not limited to worsening of Crohn's disease, immunosuppression, allergic reactions and infections.  The patient understands that monitoring is required including a PPD at baseline and must alert us or the primary physician if symptoms of infection or other concerning signs are noted. Rituxan Pregnancy And Lactation Text: This medication is Pregnancy Category C and it isn't know if it is safe during pregnancy. It is unknown if this medication is excreted in breast milk but similar antibodies are known to be excreted. Azathioprine Counseling:  I discussed with the patient the risks of azathioprine including but not limited to myelosuppression, immunosuppression, hepatotoxicity, lymphoma, and infections.  The patient understands that monitoring is required including baseline LFTs, Creatinine, possible TPMP genotyping and weekly CBCs for the first month and then every 2 weeks thereafter.  The patient verbalized understanding of the proper use and possible adverse effects of azathioprine.  All of the patient's questions and concerns were addressed. Terbinafine Counseling: Patient counseling regarding adverse effects of terbinafine including but not limited to headache, diarrhea, rash, upset stomach, liver function test abnormalities, itching, taste/smell disturbance, nausea, abdominal pain, and flatulence.  There is a rare possibility of liver failure that can occur when taking terbinafine.  The patient understands that a baseline LFT and kidney function test may be required. The patient verbalized understanding of the proper use and possible adverse effects of terbinafine.  All of the patient's questions and concerns were addressed. Isotretinoin Pregnancy And Lactation Text: This medication is Pregnancy Category X and is considered extremely dangerous during pregnancy. It is unknown if it is excreted in breast milk. Cimzia Pregnancy And Lactation Text: This medication crosses the placenta but can be considered safe in certain situations. Cimzia may be excreted in breast milk.

## 2022-11-08 NOTE — OB RN DELIVERY SUMMARY - NSDELIVERYTYPEA_OBGYN_ALL_OB
----- Message from Amarilys Barber RN sent at 9/29/2021 10:29 AM CDT -----  Regarding: CHF biweekly  CHF biweekly     Vaginal Delivery

## 2022-11-08 NOTE — OB PROVIDER TRIAGE NOTE - HISTORY OF PRESENT ILLNESS
32y  at 37w1d GA by LMP consistent with first trimester moise who presents to L&D for elevated BPs at home in the 150s/90s. She was seen in the office yesterday and had 150s/90s BPs an was told to monitor and come in if elevated again. She reports an associated 6/10 new onset headache this morning; has not taken anything for pain. Denies blurred vision, RUQ or epigastric pain, SOB. She is also having 3/10 contraction pain every 15-20m. She was checked in the office yesterday and reports being 1-2cm dilated. Patient denies vaginal bleeding and leakage of fluid. She endorses good fetal movement. Denies fevers, chills, nausea, vomiting, chest pain, dizziness. No other complaints at this time.   TARAS: 22  LMP: 22  Prenatal course is significant for: DaylinN      POB:   2018 - full term - c/b GDMA2 on insulin and PEC  SAB 2016 - D&C  PGYN: -fibroids, -ovarian cysts, denies STD hx, denies abnormal PAPs   PMH: cHTN  PSH: tonsillectomy, adenoidectomy, D&C  SH: Denies EtOH, use during this pregnancy; use of cigarettes and cannabis in 1st trimester - quit once found out pregnancy; feels safe at home   Meds: PNVs  Allergies: NKDA

## 2022-11-08 NOTE — OB RN TRIAGE NOTE - FALL HARM RISK - UNIVERSAL INTERVENTIONS
Bed in lowest position, wheels locked, appropriate side rails in place/Call bell, personal items and telephone in reach/Instruct patient to call for assistance before getting out of bed or chair/Non-slip footwear when patient is out of bed/[place] to call system/Physically safe environment - no spills, clutter or unnecessary equipment/Purposeful Proactive Rounding/Room/bathroom lighting operational, light cord in reach

## 2022-11-08 NOTE — OB PROVIDER H&P - ASSESSMENT
32y  at 37w1d GA by LMP consistent with first trimester sono who presents to L&D for elevated BPs at home in the 150s/90s.    -Admit to L&D  -Consent  -Admission labs  -NPO, except ice chips   -IV fluids  -Fetus: Cat I tracing. Continuous toco and fetal monitoring.   -GBS: Negative, no GBS ppx required   -Analgesia: no epidural at this time   -Expectant management at this time; will augment as indicated    Discussed with Dr. Benson

## 2022-11-08 NOTE — OB RN PATIENT PROFILE - FALL HARM RISK - UNIVERSAL INTERVENTIONS
Bed in lowest position, wheels locked, appropriate side rails in place/Call bell, personal items and telephone in reach/Instruct patient to call for assistance before getting out of bed or chair/Non-slip footwear when patient is out of bed/[place] to call system/Physically safe environment - no spills, clutter or unnecessary equipment/Purposeful Proactive Rounding/Room/bathroom lighting operational, light cord in reach

## 2022-11-08 NOTE — OB PROVIDER LABOR PROGRESS NOTE - NS_SUBJECTIVE/OBJECTIVE_OBGYN_ALL_OB_FT
Patient examined at bedside
Patient resting comfortably. Unable to start augmentation of labor due to no labor rooms available at this time.
Patient is requesting an epidural
Pt seen and examined for vaginal pain. Pt had been sitting for epidural

## 2022-11-08 NOTE — OB RN DELIVERY SUMMARY - NSSELHIDDEN_OBGYN_ALL_OB_FT
[NS_DeliveryRN_OBGYN_ALL_OB_FT:[id number]] [NS_DeliveryRN_OBGYN_ALL_OB_FT:[id number]],[NS_DeliveryAttending1_OBGYN_ALL_OB_FT:[id number]=],[NS_DeliveryAssist1_OBGYN_ALL_OB_FT:[id number]=]

## 2022-11-08 NOTE — OB PROVIDER LABOR PROGRESS NOTE - ASSESSMENT
Cat I tracing  Will continue with pitocin  Patient will receive an epidural  Will continue to monitor
Pt admitted in labor. Patient resting comfortably. No complaints at this time.     - VSS   - FHR tracing Cat. I     Plan:   - Once in labor room start pitocin and AROM   - Re-evaluate per patient symptoms or as indicated by continuous fetal monitoring    Discussed with Dr. Benson  
Plan:  - VSS  - Reactive tracing  - Pit started 1736  - Continuous FHT/[place]  - Maternal/fetal status reassuring    Plan d/w Dr. Fabian 
labor   - pain - pt s/p 2nd attempt at epidural   - baby feels LOP   - maternal repositioning   - will closely monitor    Ankita Olguin MD

## 2022-11-08 NOTE — CHART NOTE - NSCHARTNOTEFT_GEN_A_CORE
s:Pt uncomfortable; epidural not successful    sve: 7/100/-2  fhr cat one  ctx q3-4    a/p continue to observe

## 2022-11-09 ENCOUNTER — APPOINTMENT (OUTPATIENT)
Dept: OBGYN | Facility: CLINIC | Age: 32
End: 2022-11-09

## 2022-11-09 ENCOUNTER — TRANSCRIPTION ENCOUNTER (OUTPATIENT)
Age: 32
End: 2022-11-09

## 2022-11-09 ENCOUNTER — APPOINTMENT (OUTPATIENT)
Dept: ANTEPARTUM | Facility: CLINIC | Age: 32
End: 2022-11-09

## 2022-11-09 LAB
BASOPHILS # BLD AUTO: 0.02 K/UL — SIGNIFICANT CHANGE UP (ref 0–0.2)
BASOPHILS NFR BLD AUTO: 0.1 % — SIGNIFICANT CHANGE UP (ref 0–2)
EOSINOPHIL # BLD AUTO: 0.01 K/UL — SIGNIFICANT CHANGE UP (ref 0–0.5)
EOSINOPHIL NFR BLD AUTO: 0.1 % — SIGNIFICANT CHANGE UP (ref 0–6)
HCT VFR BLD CALC: 31.4 % — LOW (ref 34.5–45)
HGB BLD-MCNC: 10.3 G/DL — LOW (ref 11.5–15.5)
IMM GRANULOCYTES NFR BLD AUTO: 0.5 % — SIGNIFICANT CHANGE UP (ref 0–0.9)
LYMPHOCYTES # BLD AUTO: 17.1 % — SIGNIFICANT CHANGE UP (ref 13–44)
LYMPHOCYTES # BLD AUTO: 2.33 K/UL — SIGNIFICANT CHANGE UP (ref 1–3.3)
MCHC RBC-ENTMCNC: 28.8 PG — SIGNIFICANT CHANGE UP (ref 27–34)
MCHC RBC-ENTMCNC: 32.8 GM/DL — SIGNIFICANT CHANGE UP (ref 32–36)
MCV RBC AUTO: 87.7 FL — SIGNIFICANT CHANGE UP (ref 80–100)
MONOCYTES # BLD AUTO: 0.98 K/UL — HIGH (ref 0–0.9)
MONOCYTES NFR BLD AUTO: 7.2 % — SIGNIFICANT CHANGE UP (ref 2–14)
NEUTROPHILS # BLD AUTO: 10.22 K/UL — HIGH (ref 1.8–7.4)
NEUTROPHILS NFR BLD AUTO: 75 % — SIGNIFICANT CHANGE UP (ref 43–77)
PLATELET # BLD AUTO: 138 K/UL — LOW (ref 150–400)
RBC # BLD: 3.58 M/UL — LOW (ref 3.8–5.2)
RBC # FLD: 14 % — SIGNIFICANT CHANGE UP (ref 10.3–14.5)
WBC # BLD: 13.63 K/UL — HIGH (ref 3.8–10.5)
WBC # FLD AUTO: 13.63 K/UL — HIGH (ref 3.8–10.5)

## 2022-11-09 RX ORDER — KETOROLAC TROMETHAMINE 30 MG/ML
30 SYRINGE (ML) INJECTION ONCE
Refills: 0 | Status: DISCONTINUED | OUTPATIENT
Start: 2022-11-09 | End: 2022-11-09

## 2022-11-09 RX ORDER — LANOLIN
1 OINTMENT (GRAM) TOPICAL EVERY 6 HOURS
Refills: 0 | Status: DISCONTINUED | OUTPATIENT
Start: 2022-11-09 | End: 2022-11-10

## 2022-11-09 RX ORDER — SIMETHICONE 80 MG/1
80 TABLET, CHEWABLE ORAL EVERY 4 HOURS
Refills: 0 | Status: DISCONTINUED | OUTPATIENT
Start: 2022-11-09 | End: 2022-11-10

## 2022-11-09 RX ORDER — ACETAMINOPHEN 500 MG
2 TABLET ORAL
Qty: 56 | Refills: 0
Start: 2022-11-09 | End: 2022-11-15

## 2022-11-09 RX ORDER — PRAMOXINE HYDROCHLORIDE 150 MG/15G
1 AEROSOL, FOAM RECTAL EVERY 4 HOURS
Refills: 0 | Status: DISCONTINUED | OUTPATIENT
Start: 2022-11-09 | End: 2022-11-10

## 2022-11-09 RX ORDER — LABETALOL HCL 100 MG
100 TABLET ORAL EVERY 12 HOURS
Refills: 0 | Status: DISCONTINUED | OUTPATIENT
Start: 2022-11-09 | End: 2022-11-10

## 2022-11-09 RX ORDER — OXYTOCIN 10 UNIT/ML
333.33 VIAL (ML) INJECTION
Qty: 20 | Refills: 0 | Status: DISCONTINUED | OUTPATIENT
Start: 2022-11-09 | End: 2022-11-10

## 2022-11-09 RX ORDER — DIBUCAINE 1 %
1 OINTMENT (GRAM) RECTAL EVERY 6 HOURS
Refills: 0 | Status: DISCONTINUED | OUTPATIENT
Start: 2022-11-09 | End: 2022-11-10

## 2022-11-09 RX ORDER — AER TRAVELER 0.5 G/1
1 SOLUTION RECTAL; TOPICAL EVERY 4 HOURS
Refills: 0 | Status: DISCONTINUED | OUTPATIENT
Start: 2022-11-09 | End: 2022-11-10

## 2022-11-09 RX ORDER — IBUPROFEN 200 MG
600 TABLET ORAL EVERY 6 HOURS
Refills: 0 | Status: DISCONTINUED | OUTPATIENT
Start: 2022-11-09 | End: 2022-11-10

## 2022-11-09 RX ORDER — TETANUS TOXOID, REDUCED DIPHTHERIA TOXOID AND ACELLULAR PERTUSSIS VACCINE, ADSORBED 5; 2.5; 8; 8; 2.5 [IU]/.5ML; [IU]/.5ML; UG/.5ML; UG/.5ML; UG/.5ML
0.5 SUSPENSION INTRAMUSCULAR ONCE
Refills: 0 | Status: DISCONTINUED | OUTPATIENT
Start: 2022-11-09 | End: 2022-11-10

## 2022-11-09 RX ORDER — DIPHENHYDRAMINE HCL 50 MG
25 CAPSULE ORAL EVERY 6 HOURS
Refills: 0 | Status: DISCONTINUED | OUTPATIENT
Start: 2022-11-09 | End: 2022-11-10

## 2022-11-09 RX ORDER — IBUPROFEN 200 MG
600 TABLET ORAL EVERY 6 HOURS
Refills: 0 | Status: COMPLETED | OUTPATIENT
Start: 2022-11-09 | End: 2023-10-08

## 2022-11-09 RX ORDER — SODIUM CHLORIDE 9 MG/ML
3 INJECTION INTRAMUSCULAR; INTRAVENOUS; SUBCUTANEOUS EVERY 8 HOURS
Refills: 0 | Status: DISCONTINUED | OUTPATIENT
Start: 2022-11-09 | End: 2022-11-10

## 2022-11-09 RX ORDER — HYDROCORTISONE 1 %
1 OINTMENT (GRAM) TOPICAL EVERY 6 HOURS
Refills: 0 | Status: DISCONTINUED | OUTPATIENT
Start: 2022-11-09 | End: 2022-11-10

## 2022-11-09 RX ORDER — MAGNESIUM HYDROXIDE 400 MG/1
30 TABLET, CHEWABLE ORAL
Refills: 0 | Status: DISCONTINUED | OUTPATIENT
Start: 2022-11-09 | End: 2022-11-10

## 2022-11-09 RX ORDER — OXYCODONE HYDROCHLORIDE 5 MG/1
5 TABLET ORAL
Refills: 0 | Status: DISCONTINUED | OUTPATIENT
Start: 2022-11-09 | End: 2022-11-10

## 2022-11-09 RX ORDER — LABETALOL HCL 100 MG
1 TABLET ORAL
Qty: 60 | Refills: 0
Start: 2022-11-09 | End: 2022-12-08

## 2022-11-09 RX ORDER — IBUPROFEN 200 MG
1 TABLET ORAL
Qty: 28 | Refills: 0
Start: 2022-11-09 | End: 2022-11-15

## 2022-11-09 RX ORDER — ACETAMINOPHEN 500 MG
975 TABLET ORAL
Refills: 0 | Status: DISCONTINUED | OUTPATIENT
Start: 2022-11-09 | End: 2022-11-10

## 2022-11-09 RX ORDER — BENZOCAINE 10 %
1 GEL (GRAM) MUCOUS MEMBRANE EVERY 6 HOURS
Refills: 0 | Status: DISCONTINUED | OUTPATIENT
Start: 2022-11-09 | End: 2022-11-10

## 2022-11-09 RX ORDER — OXYCODONE HYDROCHLORIDE 5 MG/1
5 TABLET ORAL ONCE
Refills: 0 | Status: DISCONTINUED | OUTPATIENT
Start: 2022-11-09 | End: 2022-11-10

## 2022-11-09 RX ADMIN — Medication 600 MILLIGRAM(S): at 17:20

## 2022-11-09 RX ADMIN — Medication 600 MILLIGRAM(S): at 05:19

## 2022-11-09 RX ADMIN — Medication 30 MILLIGRAM(S): at 01:11

## 2022-11-09 RX ADMIN — Medication 100 MILLIGRAM(S): at 01:41

## 2022-11-09 RX ADMIN — Medication 975 MILLIGRAM(S): at 08:37

## 2022-11-09 RX ADMIN — Medication 100 MILLIGRAM(S): at 17:20

## 2022-11-09 RX ADMIN — Medication 975 MILLIGRAM(S): at 03:30

## 2022-11-09 RX ADMIN — SODIUM CHLORIDE 3 MILLILITER(S): 9 INJECTION INTRAMUSCULAR; INTRAVENOUS; SUBCUTANEOUS at 15:00

## 2022-11-09 RX ADMIN — Medication 1 TABLET(S): at 11:06

## 2022-11-09 RX ADMIN — SODIUM CHLORIDE 3 MILLILITER(S): 9 INJECTION INTRAMUSCULAR; INTRAVENOUS; SUBCUTANEOUS at 05:06

## 2022-11-09 RX ADMIN — Medication 600 MILLIGRAM(S): at 11:03

## 2022-11-09 RX ADMIN — Medication 975 MILLIGRAM(S): at 21:08

## 2022-11-09 RX ADMIN — Medication 975 MILLIGRAM(S): at 14:17

## 2022-11-09 NOTE — DISCHARGE NOTE OB - CARE PLAN
Principal Discharge DX:	 (normal spontaneous vaginal delivery)  Assessment and plan of treatment:	Please call your provider to schedule postpartum visit in 2-3 weeks. Take medications as directed, regular diet, activity as tolerated. Exclusive breast feeding for the first 6 months is recommended. Nothing per vagina for 6 weeks (incl. sex, douching, etc). If you have additional concerns, please inform your provider.   1

## 2022-11-09 NOTE — OB PROVIDER DELIVERY SUMMARY - NSPROVIDERDELIVERYNOTE_OBGYN_ALL_OB_FT
at 23:52 PM of a live male, *** gm and Apgars 9/9. Delivered JOSIAH, no nuchal cord, clear fluid. Infant's head delivered with maternal expulsive efforts. Shoulders delivered without difficulty followed by the rest of the body. Nose and mouth were bulb suctioned. Cord clamped and cut after delay. Samples obtained. Baby handed to patient. Placenta delivered spontaneously, intact, 3VC. Fundus firm, minimal bleeding. Perineum and vagina inspected – no lacerations noted. EBL 563cc. Rectal cytotec given at 00:02 AM. Hemostasis noted. Pt tolerated procedure well, in stable condition, recovering in LDR. Infant in LDR. Instrument/sponge count correct x 2 and confirmed by nurse.  at 23:52 PM of a live male, Apgars 9/9. Delivered JOSIAH, no nuchal cord, clear fluid. Infant's head delivered with maternal expulsive efforts. Shoulders delivered without difficulty followed by the rest of the body. Nose and mouth were bulb suctioned. Cord clamped and cut after 30 second delay. Cord blood and gas obtained. Baby handed to patient for skin to skin. Placenta delivered spontaneously, intact, 3VC. Uterus noted to have intermittent boggines; Rectal cytotec given at 00:02 AM. Perineum and vagina inspected – no lacerations noted. EBL 563cc.  Hemostasis noted. Pt tolerated procedure well, in stable condition, recovering in LDR. Infant in LDR. Instrument/sponge count correct x 2 and confirmed by nurse.

## 2022-11-09 NOTE — DISCHARGE NOTE OB - MEDICATION SUMMARY - MEDICATIONS TO TAKE
I will START or STAY ON the medications listed below when I get home from the hospital:    acetaminophen 500 mg oral tablet  -- 2 tab(s) by mouth every 6 hours   -- This product contains acetaminophen.  Do not use  with any other product containing acetaminophen to prevent possible liver damage.    -- Indication: For pain    ibuprofen 600 mg oral tablet  -- 1 tab(s) by mouth every 6 hours   -- Do not take this drug if you are pregnant.  It is very important that you take or use this exactly as directed.  Do not skip doses or discontinue unless directed by your doctor.  May cause drowsiness or dizziness.  Obtain medical advice before taking any non-prescription drugs as some may affect the action of this medication.  Take with food or milk.    -- Indication: For pain    labetalol 100 mg oral tablet  -- 1 tab(s) by mouth 2 times a day   -- It is very important that you take or use this exactly as directed.  Do not skip doses or discontinue unless directed by your doctor.  May cause drowsiness or dizziness.  Some non-prescription drugs may aggravate your condition.  Read all labels carefully.  If a warning appears, check with your doctor before taking.    -- Indication: For Hypertension

## 2022-11-09 NOTE — DISCHARGE NOTE OB - HOSPITAL COURSE
Normal spontaneous vaginal delivery at 37w1d secondary to chronic hypertension, complicated by stable postpartum hemorrhage s/p rectal cytotec. Postpartum pain is well controlled with PRN medication. She has no difficulty with ambulation, voiding or PO intake. Lab values and vital signs are within normal limits prior to discharge.

## 2022-11-09 NOTE — PROGRESS NOTE ADULT - ASSESSMENT
A/P: BENI CHAHAL is a 32y  now PPD#1 s/p spontaneous vaginal delivery at 37w1d gestation 2/2 CHTN (PIH wnl, P/C 0.2), c/b stable PPH, [place] cyto.    -Vital signs stable  -Hgb: 11.7 -> AM labs pending   -Voiding, tolerating PO  -Advance care as tolerated   -Continue routine postpartum care and education  -Healthy male infant, desires circumcision  -Dispo: Patient to be discharged when meeting all postpartum milestones and pending attending approval.      A/P: BENI CHAHAL is a 32y  now PPD#1 s/p spontaneous vaginal delivery at 37w1d gestation 2/2 CHTN (PIH wnl, P/C 0.2), c/b stable PPH, [place] cyto.    -Vital signs stable  -Hgb: 11.7 -> AM labs pending   -Voiding, tolerating PO  -Advance care as tolerated   -Continue routine postpartum care and education  -Healthy male infant, desires circumcision  -Had post-placenta Mirena IUD palced  -Dispo: Patient to be discharged when meeting all postpartum milestones and pending attending approval.     Will d/w Dr. Cole A/P: BENI CHAHAL is a 32y  now PPD#1 s/p spontaneous vaginal delivery at 37w1d gestation 2/2 CHTN (PIH wnl, P/C 0.2), c/b stable PPH, [place] cyto.    -Vital signs stable; normotensive postpartum and overnight  -cHTN; continue labetalol 100BID  -Hgb: 11.7 -> AM labs pending   -Voiding, tolerating PO  -Advance care as tolerated   -Continue routine postpartum care and education  -Healthy male infant, desires circumcision  -Had post-placenta Mirena IUD palced  -Dispo: Patient to be discharged when meeting all postpartum milestones and pending attending approval.     Will d/w Dr. Cole

## 2022-11-09 NOTE — DISCHARGE NOTE OB - CARE PROVIDER_API CALL
Tanner Storey (DO)  Obstetrics and Gynecology  [address], [facility] [address] [place] [address]  Phone: [phone]  Fax: [phone]  Follow Up Time: 2 weeks

## 2022-11-09 NOTE — OB PROVIDER DELIVERY SUMMARY - NSSELHIDDEN_OBGYN_ALL_OB_FT
[NS_DeliveryRN_OBGYN_ALL_OB_FT:[id number]],[NS_DeliveryAttending1_OBGYN_ALL_OB_FT:[id number]],[NS_DeliveryAssist1_OBGYN_ALL_OB_FT:[id number]=]

## 2022-11-09 NOTE — PROGRESS NOTE ADULT - SUBJECTIVE AND OBJECTIVE BOX
BENI CHAHAL is a 32y  now PPD#1 s/p spontaneous vaginal delivery at 37w1d gestation 2/2 CHTN (PIH wnl, P/C 0.2), c/b stable PPH, [place] cyto.    S:    No acute events overnight.   The patient has no complaints.  Pain controlled with current treatment regimen.   She is ambulating without difficulty and tolerating PO.   + flatus/-BM/+ voiding   She endorses appropriate lochia, which is decreasing.   She is breastfeeding without difficulty.   She denies fevers, chills, nausea and vomiting.   She denies lightheadedness, dizziness, palpitations, chest pain and SOB.     O:    T(C): 37.1 (22 @ 00:11), Max: 37.3 (22 @ 07:29)  HR: 101 (22 @ 03:09) (82 - 121)  BP: 124/57 (22 @ 03:09) (99/53 - 192/93)  RR: 19 (22 @ 00:11) (12 - 19)  SpO2: 91% (22 @ 23:49) (75% - 100%)    Gen: NAD, AOx3, resting comfortably on room air   Abdomen:  Soft, non-tender, non-distended  Uterus:  Fundus firm below umbilicus  VE:  Expected lochia  Ext:  b/l LE non-tender                           11.7   8.32  )-----------( 153      ( 2022 07:45 )             35.2         136  |  103  |  5.5<L>  ----------------------------<  70  3.5   |  21.0<L>  |  0.39<L>    Ca    8.8      2022 07:45    TPro  7.0  /  Alb  3.4  /  TBili  0.7  /  DBili  x   /  AST  21  /  ALT  11  /  AlkPhos  98     BENI CHAHAL is a 32y  now PPD#1 s/p spontaneous vaginal delivery at 37w1d gestation 2/2 CHTN (PIH wnl, P/C 0.2), c/b stable PPH, [place] cyto.    S:    No acute events overnight.   The patient has no complaints.  Pain controlled with current treatment regimen.   She is ambulating without difficulty and tolerating PO.   - flatus/-BM/+ voiding   She endorses appropriate lochia, which is decreasing.   She is breastfeeding without difficulty.   She denies fevers, chills, nausea and vomiting.   She denies lightheadedness, dizziness, palpitations, chest pain and SOB.     O:    T(C): 37.1 (22 @ 00:11), Max: 37.3 (22 @ 07:29)  HR: 101 (22 @ 03:09) (82 - 121)  BP: 124/57 (22 @ 03:09) (99/53 - 192/93)  RR: 19 (22 @ 00:11) (12 - 19)  SpO2: 91% (22 @ 23:49) (75% - 100%)    Gen: NAD, AOx3, resting comfortably on room air   Abdomen:  Soft, non-tender, non-distended  Uterus:  Fundus firm below umbilicus  VE:  Expected lochia  Ext:  b/l LE non-tender                           11.7   8.32  )-----------( 153      ( 2022 07:45 )             35.2         136  |  103  |  5.5<L>  ----------------------------<  70  3.5   |  21.0<L>  |  0.39<L>    Ca    8.8      2022 07:45    TPro  7.0  /  Alb  3.4  /  TBili  0.7  /  DBili  x   /  AST  21  /  ALT  11  /  AlkPhos  98     BENI CHAHAL is a 32y  now PPD#1 s/p spontaneous vaginal delivery at 37w1d gestation 2/2 CHTN (PIH wnl, P/C 0.2), c/b stable PPH, [place] cyto.    S:    No acute events overnight.   The patient has no complaints.  Pain controlled with current treatment regimen.   She is ambulating without difficulty and tolerating PO.   - flatus/-BM/+ voiding   She endorses appropriate lochia, which is decreasing.   She is breastfeeding without difficulty.   She denies fevers, chills, nausea and vomiting.   She denies lightheadedness, dizziness, palpitations, chest pain and SOB.     O:    T(C): 37.1 (22 @ 00:11), Max: 37.3 (22 @ 07:29)  HR: 101 (22 @ 03:09) (82 - 121)  BP: 124/57 (22 @ 03:09) (99/53 - 192/93)  RR: 19 (22 @ 00:11) (12 - 19)  SpO2: 91% (22 @ 23:49) (75% - 100%)    Gen: NAD, AOx3, resting comfortably on room air   Abdomen:  Soft, non-tender, non-distended  Uterus:  Fundus firm below umbilicus  VE:  Expected lochia  Ext:  b/l LE non-tender

## 2022-11-09 NOTE — SBIRT NOTE ADULT - NSSBIRTDRGPOSREINDET_GEN_A_CORE
Pt reports they do not use any drugs other than those prescribed. SW provided positive reinforcement to pt.

## 2022-11-09 NOTE — DISCHARGE NOTE OB - PATIENT PORTAL LINK FT
You can access the FollowMyHealth Patient Portal offered by [facility] by registering at the following website: [email or web address]. By joining [email or web address] [email or web address] portal, you will also be able to view your health information using other applications (apps) compatible with our system.

## 2022-11-09 NOTE — OB PROVIDER DELIVERY SUMMARY - NSEPISIOTOMY_OBGYN_ALL_OB
No Wound Care: Bacitracin Lab Facility: [id number] Depth Of Biopsy: dermis X Size Of Lesion In Cm: 0 Destruction After The Procedure: No Consent: Verbal consent was obtained and risks were reviewed including but not limited to scarring, infection, bleeding, scabbing, incomplete removal, nerve damage and allergy to anesthesia. Anesthesia Type: 1% lidocaine with epinephrine Cryotherapy Text: The wound bed was treated with cryotherapy after the biopsy was performed. Accession #: [id number] Anesthesia Volume In Cc (Will Not Render If 0): 1.2 Electrodesiccation And Curettage Text: The wound bed was treated with electrodesiccation and curettage after the biopsy was performed. Type Of Destruction Used: Curettage Render Post-Care Instructions In Note?: yes Electrodesiccation Text: The wound bed was treated with electrodesiccation after the biopsy was performed. Post-Care Instructions: I reviewed with the patient in detail post-care instructions. Patient is to keep the biopsy site dry overnight, and then apply bacitracin twice daily until healed. Patient may apply hydrogen peroxide soaks to remove any crusting. Biopsy Type: Frozen Section Dressing: bandage Billing Type: Third-Party Bill Lab: [id number] Biopsy Method: Dermablade Notification Instructions: Patient was reported of results of a squamous cell carcinoma at visit Curettage Text: The wound bed was treated with curettage after the biopsy was performed. Silver Nitrate Text: The wound bed was treated with silver nitrate after the biopsy was performed. Hemostasis: Electrocautery Detail Level: Detailed

## 2022-11-10 VITALS
SYSTOLIC BLOOD PRESSURE: 133 MMHG | HEART RATE: 85 BPM | OXYGEN SATURATION: 98 % | DIASTOLIC BLOOD PRESSURE: 76 MMHG | TEMPERATURE: 99 F | RESPIRATION RATE: 16 BRPM

## 2022-11-10 PROCEDURE — 86850 RBC ANTIBODY SCREEN: CPT

## 2022-11-10 PROCEDURE — 80053 COMPREHEN METABOLIC PANEL: CPT

## 2022-11-10 PROCEDURE — 82570 ASSAY OF URINE CREATININE: CPT

## 2022-11-10 PROCEDURE — 85730 THROMBOPLASTIN TIME PARTIAL: CPT

## 2022-11-10 PROCEDURE — 84550 ASSAY OF BLOOD/URIC ACID: CPT

## 2022-11-10 PROCEDURE — 85610 PROTHROMBIN TIME: CPT

## 2022-11-10 PROCEDURE — 86780 TREPONEMA PALLIDUM: CPT

## 2022-11-10 PROCEDURE — 85025 COMPLETE CBC W/AUTO DIFF WBC: CPT

## 2022-11-10 PROCEDURE — 83615 LACTATE (LD) (LDH) ENZYME: CPT

## 2022-11-10 PROCEDURE — 86769 SARS-COV-2 COVID-19 ANTIBODY: CPT

## 2022-11-10 PROCEDURE — 86900 BLOOD TYPING SEROLOGIC ABO: CPT

## 2022-11-10 PROCEDURE — 85384 FIBRINOGEN ACTIVITY: CPT

## 2022-11-10 PROCEDURE — 81001 URINALYSIS AUTO W/SCOPE: CPT

## 2022-11-10 PROCEDURE — 36415 COLL VENOUS BLD VENIPUNCTURE: CPT

## 2022-11-10 PROCEDURE — 86901 BLOOD TYPING SEROLOGIC RH(D): CPT

## 2022-11-10 PROCEDURE — U0005: CPT

## 2022-11-10 PROCEDURE — 84156 ASSAY OF PROTEIN URINE: CPT

## 2022-11-10 PROCEDURE — U0003: CPT

## 2022-11-10 RX ADMIN — Medication 1 TABLET(S): at 11:47

## 2022-11-10 RX ADMIN — Medication 600 MILLIGRAM(S): at 05:14

## 2022-11-10 RX ADMIN — Medication 975 MILLIGRAM(S): at 08:38

## 2022-11-10 RX ADMIN — Medication 600 MILLIGRAM(S): at 12:30

## 2022-11-10 RX ADMIN — Medication 600 MILLIGRAM(S): at 11:47

## 2022-11-10 RX ADMIN — Medication 100 MILLIGRAM(S): at 05:14

## 2022-11-10 RX ADMIN — Medication 975 MILLIGRAM(S): at 09:30

## 2022-11-10 NOTE — PROGRESS NOTE ADULT - SUBJECTIVE AND OBJECTIVE BOX
BENI CHAHAL is a 32y  now PPD#2 s/p spontaneous vaginal delivery at 37w1d gestation secondary to chronic hypertension (PIH wnl, P/C 0.2), delivery complicated by stable postpartum hemorrhage, [place] cytotec.    S:    No acute events overnight.  BPs 130s/70-80s overnight labetalol 100BID  The patient has no complaints.  Pain controlled with current treatment regimen.   She is ambulating without difficulty and tolerating PO.   + flatus/+BM/+ voiding   She endorses appropriate lochia, which is decreasing.   She is breastfeeding without difficulty.   She denies fevers, chills, nausea and vomiting.   She denies lightheadedness, dizziness, palpitations, chest pain and SOB.     O:    Vital Signs Last 24 Hrs  T(C): 36.5 (2022 16:17), Max: 37.1 (2022 06:08)  T(F): 97.7 (2022 16:17), Max: 98.7 (2022 06:08)  HR: 88 (2022 16:17) (85 - 96)  BP: 139/81 (2022 16:17) (121/65 - 141/70)  RR: 16 (2022 16:17) (16 - 18)  SpO2: 100% (2022 16:17) (100% - 100%)      Gen: NAD, AOx3, resting comfortably on room air   Abdomen:  Soft, non-tender, non-distended  Uterus:  Fundus firm below umbilicus  VE:  Expected lochia  Ext:  b/l LE non-tender                               10.3   13.63 )-----------( 138      ( 2022 07:34 )             31.4

## 2022-11-10 NOTE — PROGRESS NOTE ADULT - ASSESSMENT
A/P: BENI CHAHAL is a 32y  now PPD#2 s/p spontaneous vaginal delivery at 37w1d gestation secondary to chronic hypertension (PIH wnl, P/C 0.2), delivery complicated by stable postpartum hemorrhage, s/p [place] cytotec.    -Vital signs stable; BPs 130s/70-80s overnight labetalol 100BID  -Hgb: 11.7 -> 10.3   -Voiding, tolerating PO  -Advance care as tolerated   -Continue routine postpartum care and education  -Healthy male infant, desires circumcision  -Had a post-placenta Mirena IUD placed  -Dispo: Patient to be discharged home today pending attending approval and completion of circumcision  A/P: BENI CHAHAL is a 32y  now PPD#2 s/p spontaneous vaginal delivery at 37w1d gestation secondary to chronic hypertension (PIH wnl, P/C 0.2), delivery complicated by stable postpartum hemorrhage, s/p [place] cytotec.    -Vital signs stable; BPs 130s/70-80s overnight labetalol 100BID  -Hgb: 11.7 -> 10.3   -Voiding, tolerating PO  -Advance care as tolerated   -Continue routine postpartum care and education  -Healthy male infant, desires circumcision  -Dispo: Discharge home    Addendum:    Subjective Hx, Physical Exam, & Laboratory results reviewed and Pt seen and examined at bedside.  I agree with the Resident Physician's assessment and plan of care, as discussed above.  She was given the opportunity to ask questions and all were addressed.    Tanner Storey, DO

## 2022-11-11 ENCOUNTER — NON-APPOINTMENT (OUTPATIENT)
Age: 32
End: 2022-11-11

## 2022-11-11 DIAGNOSIS — O13.9 GESTATIONAL [PREGNANCY-INDUCED] HYPERTENSION W/OUT SIGNIFICANT PROTEINURIA, UNSPECIFIED TRIMESTER: ICD-10-CM

## 2022-11-11 PROBLEM — I10 ESSENTIAL (PRIMARY) HYPERTENSION: Chronic | Status: ACTIVE | Noted: 2022-11-08

## 2022-11-11 RX ORDER — DOXYLAMINE SUCCINATE AND PYRIDOXINE HYDROCHLORIDE 10; 10 MG/1; MG/1
10-10 TABLET, DELAYED RELEASE ORAL
Qty: 90 | Refills: 2 | Status: DISCONTINUED | COMMUNITY
Start: 2022-04-06 | End: 2022-11-11

## 2022-11-14 ENCOUNTER — NON-APPOINTMENT (OUTPATIENT)
Age: 32
End: 2022-11-14

## 2022-11-14 ENCOUNTER — APPOINTMENT (OUTPATIENT)
Dept: OBGYN | Facility: CLINIC | Age: 32
End: 2022-11-14

## 2022-11-14 VITALS — WEIGHT: 293 LBS | BODY MASS INDEX: 43.4 KG/M2 | HEIGHT: 69 IN

## 2022-11-14 DIAGNOSIS — Z87.59 PERSONAL HISTORY OF OTHER COMPLICATIONS OF PREGNANCY, CHILDBIRTH AND THE PUERPERIUM: ICD-10-CM

## 2022-11-14 PROCEDURE — 99213 OFFICE O/P EST LOW 20 MIN: CPT

## 2022-11-14 RX ORDER — LABETALOL HYDROCHLORIDE 300 MG/1
300 TABLET, FILM COATED ORAL
Qty: 56 | Refills: 2 | Status: ACTIVE | COMMUNITY
Start: 2022-11-14 | End: 1900-01-01

## 2022-11-15 ENCOUNTER — NON-APPOINTMENT (OUTPATIENT)
Age: 32
End: 2022-11-15

## 2022-11-16 ENCOUNTER — APPOINTMENT (OUTPATIENT)
Dept: CARDIOLOGY | Facility: CLINIC | Age: 32
End: 2022-11-16

## 2022-11-16 ENCOUNTER — NON-APPOINTMENT (OUTPATIENT)
Age: 32
End: 2022-11-16

## 2022-11-16 VITALS
HEART RATE: 74 BPM | WEIGHT: 291.25 LBS | OXYGEN SATURATION: 98 % | DIASTOLIC BLOOD PRESSURE: 84 MMHG | TEMPERATURE: 98.4 F | SYSTOLIC BLOOD PRESSURE: 160 MMHG | BODY MASS INDEX: 43.14 KG/M2 | HEIGHT: 69 IN

## 2022-11-16 VITALS — SYSTOLIC BLOOD PRESSURE: 158 MMHG | DIASTOLIC BLOOD PRESSURE: 78 MMHG

## 2022-11-16 PROCEDURE — 99204 OFFICE O/P NEW MOD 45 MIN: CPT | Mod: 25

## 2022-11-16 PROCEDURE — 93000 ELECTROCARDIOGRAM COMPLETE: CPT

## 2022-11-16 RX ORDER — LABETALOL HYDROCHLORIDE 100 MG/1
100 TABLET, FILM COATED ORAL
Qty: 30 | Refills: 2 | Status: DISCONTINUED | COMMUNITY
Start: 2022-10-27 | End: 2022-11-16

## 2022-11-16 RX ORDER — ACETAMINOPHEN EXTRA STRENGTH 500 MG/1
500 TABLET ORAL
Qty: 56 | Refills: 0 | Status: ACTIVE | COMMUNITY
Start: 2022-11-10

## 2022-11-16 RX ORDER — HYDROCHLOROTHIAZIDE 25 MG/1
25 TABLET ORAL DAILY
Qty: 30 | Refills: 0 | Status: ACTIVE | COMMUNITY
Start: 2022-11-16 | End: 1900-01-01

## 2022-11-16 NOTE — PHYSICAL EXAM
[Well Developed] : well developed [Well Nourished] : well nourished [No Acute Distress] : no acute distress [Normal Conjunctiva] : normal conjunctiva [Normal Venous Pressure] : normal venous pressure [No Carotid Bruit] : no carotid bruit [Normal S1, S2] : normal S1, S2 [No Murmur] : no murmur [No Rub] : no rub [No Gallop] : no gallop [Good Air Entry] : good air entry [No Respiratory Distress] : no respiratory distress  [Soft] : abdomen soft [Non Tender] : non-tender [No Masses/organomegaly] : no masses/organomegaly [Normal Bowel Sounds] : normal bowel sounds [Normal Gait] : normal gait [No Cyanosis] : no cyanosis [No Clubbing] : no clubbing [No Varicosities] : no varicosities [No Rash] : no rash [No Skin Lesions] : no skin lesions [Moves all extremities] : moves all extremities [No Focal Deficits] : no focal deficits [Normal Speech] : normal speech [Alert and Oriented] : alert and oriented [Normal memory] : normal memory [de-identified] : Difficult exam due to body habitus [de-identified] : +nonpitting LE edema b/l

## 2022-11-16 NOTE — DISCUSSION/SUMMARY
[___ Week(s)] : in [unfilled] week(s) [FreeTextEntry1] : 33 y/o F with PMH HTN (reports history of uncontrolled BP, previously noncompliant with followups), , s/p induced vaginal delivery on  at 37 weeks due to elevated BP. C/o dyspnea and LE edema.\par \par #Dyspnea/LE edema\par Comfortable with SaO2 98% on RA at time of exam \par Ordered BMP, pBNP, CXR, TTE, urine protein:Cr ratio for further evaluation\par Would not start spironolactone; add HCTZ 25mg daily \par Patient advised to go to ED for further evaluation if she has worsening symptoms of CP or dyspnea \par \par #HTN\par Uncontrolled\par Patient instructed to increase labetalol to 300mg bid and take medications as prescribed\par Add HCTZ 25mg daily\par DASH diet discussed\par \par #Morbid obesity\par BMI 43\par Diet, lifestyle modifcations discussed\par Check lipid panel  [EKG obtained to assist in diagnosis and management of assessed problem(s)] : EKG obtained to assist in diagnosis and management of assessed problem(s)

## 2022-11-16 NOTE — HISTORY OF PRESENT ILLNESS
[FreeTextEntry1] : 33 y/o F with PMH HTN (reports history of uncontrolled BP, previously noncompliant with followups), , s/p induced vaginal delivery on  at 37 weeks due to elevated BP. Patient reports that since delivery, she has been feeling SOB, mostly at night. Sleeps on 3 pillows. Also reports LE edema. She was dicharged on labetalol 100mg bid; saw OB yesterday and was advised to increase to 300mg; patient was confused by instructions and has been taking 100mg in the morning and 200mg at night. Patient is breastfeeding. She also complains of midsternal chest discomfort which is worse when laying down. Reports occasional HA, which resolves with tylenol. Denies blurry vision, palpitations, dizziness, lightheadedness, abdominal pain. Reports LE edema b/l, which is unchanged since delivery. Was prescribed spironolactone by OBGYN, which she has not started taking yet. \par \par Family Hx: denies premature CAD or sudden cardiac death \par Social Hx: former smoker, 1/2 ppd x 15 years, quit when she became pregnant. Occasional ETOH.

## 2022-11-18 NOTE — HISTORY OF PRESENT ILLNESS
[] : delivered by vaginal delivery [Breastfeeding] : currently nursing [FreeTextEntry9] : chronic htn [de-identified] : co irritability, no depression. [de-identified] : chron htn- 150/90. incr labetolol to 300 bid, rto 1 week.

## 2022-11-20 ENCOUNTER — NON-APPOINTMENT (OUTPATIENT)
Age: 32
End: 2022-11-20

## 2022-11-21 NOTE — CHART NOTE - NSCHARTNOTEFT_GEN_A_CORE
[facility] Note:    Initial Screening    Date of Initial Screenin22    Patient qualifies for [facility]  [ x ] Patient qualifies for [facility] w/ health condition  [  ] Patient does not qualify for [employer] [facility] w/ no health conditions    Diagnosis that qualifies patient for program  [  ] Hypertension  [  ] Diabetes  [  ] Unintended weight loss  [  ] Obesity  [  ] CHF  [ x ] Other    Additional Comments: postpartum          Current Patient Diet: regular diet      Actions Taken:  [ x ] Spoke with patient  [ x ] [employer] survey completed  Additional Comments: Spoke with patient to provide nutrition education and assistance with WIC application. RD was unavailable when patient D/C, but will call to schedule grocery .          Non-qualification for [facility]  [   ] D/C to [facility]  [   ] Referred to Social Work  [  ] Declined [facility]  [  x ] D/C Before Seen By RD  Participant Phone Number:  MACI[facility] Comments:              Discharge Visit  [  ] Initial Screening already completed    Date of D/C:  [  ]  Patient provided with healthy groceries  [ x ] Follow Up appointment made  [ x ] Other community outreach given  [ x ] Help with SNAP application at F/U appointment  [  ] Patient D/C while RD was unavailable. Will call to schedule pick-up

## 2022-11-23 ENCOUNTER — APPOINTMENT (OUTPATIENT)
Dept: CARDIOLOGY | Facility: CLINIC | Age: 32
End: 2022-11-23

## 2022-11-23 VITALS
WEIGHT: 275.6 LBS | BODY MASS INDEX: 40.82 KG/M2 | TEMPERATURE: 98.3 F | OXYGEN SATURATION: 98 % | HEART RATE: 74 BPM | SYSTOLIC BLOOD PRESSURE: 156 MMHG | HEIGHT: 69 IN | DIASTOLIC BLOOD PRESSURE: 90 MMHG

## 2022-11-23 DIAGNOSIS — R60.0 LOCALIZED EDEMA: ICD-10-CM

## 2022-11-23 DIAGNOSIS — R06.00 DYSPNEA, UNSPECIFIED: ICD-10-CM

## 2022-11-23 DIAGNOSIS — O10.919 UNSPECIFIED PRE-EXISTING HYPERTENSION COMPLICATING PREGNANCY, UNSPECIFIED TRIMESTER: ICD-10-CM

## 2022-11-23 DIAGNOSIS — O99.210 OBESITY COMPLICATING PREGNANCY, UNSPECIFIED TRIMESTER: ICD-10-CM

## 2022-11-23 PROCEDURE — 99214 OFFICE O/P EST MOD 30 MIN: CPT | Mod: 25

## 2022-11-23 PROCEDURE — 93000 ELECTROCARDIOGRAM COMPLETE: CPT

## 2022-11-23 RX ORDER — SPIRONOLACTONE 50 MG/1
50 TABLET ORAL DAILY
Qty: 28 | Refills: 2 | Status: DISCONTINUED | COMMUNITY
Start: 2022-11-14 | End: 2022-11-23

## 2022-11-23 NOTE — DISCUSSION/SUMMARY
[___ Week(s)] : in [unfilled] week(s) [FreeTextEntry1] : 31 y/o F with PMH HTN (reports history of uncontrolled BP, previously noncompliant with followups), , s/p induced vaginal delivery on  at 37 weeks due to elevated BP. C/o dyspnea and LE edema.\par \par #Dyspnea/LE edema\par Improved\par Previously ordered BMP, pBNP, CXR, TTE, urine protein:Cr ratio for further evaluation- patient advised to complete bloodwork\par Continue HCTZ \par Patient advised to go to ED for further evaluation if she has worsening symptoms of CP or dyspnea \par \par #HTN\par Uncontrolled\par Patient instructed to increase labetalol to 300mg q8 and take medications as prescribed\par Continue HCTZ 25mg daily\par DASH diet discussed\par \par #Morbid obesity\par BMI 43\par Diet, lifestyle modifcations discussed\par Check lipid panel.  [EKG obtained to assist in diagnosis and management of assessed problem(s)] : EKG obtained to assist in diagnosis and management of assessed problem(s)

## 2022-11-23 NOTE — PHYSICAL EXAM
[Well Developed] : well developed [Well Nourished] : well nourished [No Acute Distress] : no acute distress [Normal Conjunctiva] : normal conjunctiva [Normal Venous Pressure] : normal venous pressure [No Carotid Bruit] : no carotid bruit [Normal S1, S2] : normal S1, S2 [No Murmur] : no murmur [No Rub] : no rub [No Gallop] : no gallop [Good Air Entry] : good air entry [No Respiratory Distress] : no respiratory distress  [Soft] : abdomen soft [Non Tender] : non-tender [No Masses/organomegaly] : no masses/organomegaly [Normal Bowel Sounds] : normal bowel sounds [Normal Gait] : normal gait [No Edema] : no edema [No Cyanosis] : no cyanosis [No Clubbing] : no clubbing [No Varicosities] : no varicosities [No Rash] : no rash [No Skin Lesions] : no skin lesions [Moves all extremities] : moves all extremities [No Focal Deficits] : no focal deficits [Normal Speech] : normal speech [Alert and Oriented] : alert and oriented [Normal memory] : normal memory [de-identified] : Difficult exam due to body habitus

## 2022-11-23 NOTE — HISTORY OF PRESENT ILLNESS
[FreeTextEntry1] : 31 y/o F with PMH HTN (reports history of uncontrolled BP, previously noncompliant with followups), , s/p induced vaginal delivery on  at 37 weeks due to elevated BP. Patient reports that since delivery, she has been feeling SOB, mostly at night. Sleeps on 3 pillows. Also reports LE edema. She was dicharged on labetalol 100mg bid; saw OB yesterday and was advised to increase to 300mg; patient was confused by instructions and has been taking 100mg in the morning and 200mg at night. Patient is breastfeeding. She also complains of midsternal chest discomfort which is worse when laying down. Reports occasional HA, which resolves with tylenol. Denies blurry vision, palpitations, dizziness, lightheadedness, abdominal pain. Reports LE edema b/l, which is unchanged since delivery. Was prescribed spironolactone by OBGYN, which she has not started taking yet. \par Family Hx: denies premature CAD or sudden cardiac death \par Social Hx: former smoker, 1/2 ppd x 15 years, quit when she became pregnant. Occasional ETOH. \par \par 22:\par Patient was last seen by me 22\par Labetalol was increased to 300mg bid for uncontrolled BP and she was started on HCTZ 25mg for LE edema\par Ordered CXR, TTE, BMP, pBNP, which patient didn't do. TTE is scheduled in 2 days. \par LE edema has resolved, reports improvement in BP \par No new complaints

## 2022-11-25 ENCOUNTER — APPOINTMENT (OUTPATIENT)
Dept: CARDIOLOGY | Facility: CLINIC | Age: 32
End: 2022-11-25

## 2022-11-28 ENCOUNTER — NON-APPOINTMENT (OUTPATIENT)
Age: 32
End: 2022-11-28

## 2022-12-01 ENCOUNTER — NON-APPOINTMENT (OUTPATIENT)
Age: 32
End: 2022-12-01

## 2022-12-05 ENCOUNTER — APPOINTMENT (OUTPATIENT)
Dept: CARDIOLOGY | Facility: CLINIC | Age: 32
End: 2022-12-05

## 2022-12-05 PROCEDURE — 93306 TTE W/DOPPLER COMPLETE: CPT

## 2022-12-20 ENCOUNTER — APPOINTMENT (OUTPATIENT)
Dept: OBGYN | Facility: CLINIC | Age: 32
End: 2022-12-20

## 2022-12-20 VITALS
HEIGHT: 69 IN | BODY MASS INDEX: 41.62 KG/M2 | SYSTOLIC BLOOD PRESSURE: 156 MMHG | WEIGHT: 281 LBS | DIASTOLIC BLOOD PRESSURE: 107 MMHG

## 2022-12-20 PROCEDURE — 0503F POSTPARTUM CARE VISIT: CPT

## 2022-12-20 NOTE — HISTORY OF PRESENT ILLNESS
[Postpartum Follow Up] : postpartum follow up [Complications:___] : no complications [] : delivered by vaginal delivery [Breastfeeding] : currently nursing [S/Sx PP Depression] : no signs/symptoms of postpartum depression [Erythema] : not erythematous [Back to Normal] : is back to normal in size [Mild] : mild vaginal bleeding [Normal] : the vagina was normal [Cervix Sample Taken] : cervical sample not taken for a Pap smear [Not Done] : Examination of breasts not done [Doing Well] : is doing well [No Sign of Infection] : is showing no signs of infection [Excellent Pain Control] : has excellent pain control [None] : None [FreeTextEntry8] : htn, forgot medications

## 2023-01-19 ENCOUNTER — APPOINTMENT (OUTPATIENT)
Dept: CARDIOLOGY | Facility: CLINIC | Age: 33
End: 2023-01-19

## 2023-02-27 ENCOUNTER — APPOINTMENT (OUTPATIENT)
Dept: OBGYN | Facility: CLINIC | Age: 33
End: 2023-02-27

## 2023-07-25 DIAGNOSIS — Z86.79 PERSONAL HISTORY OF OTHER DISEASES OF THE CIRCULATORY SYSTEM: ICD-10-CM

## 2023-07-25 DIAGNOSIS — R10.9 UNSPECIFIED ABDOMINAL PAIN: ICD-10-CM

## 2023-07-26 ENCOUNTER — APPOINTMENT (OUTPATIENT)
Dept: ANTEPARTUM | Facility: CLINIC | Age: 33
End: 2023-07-26
Payer: MEDICAID

## 2023-07-26 ENCOUNTER — APPOINTMENT (OUTPATIENT)
Dept: OBGYN | Facility: CLINIC | Age: 33
End: 2023-07-26
Payer: MEDICAID

## 2023-07-26 ENCOUNTER — TRANSCRIPTION ENCOUNTER (OUTPATIENT)
Age: 33
End: 2023-07-26

## 2023-07-26 VITALS
TEMPERATURE: 98.7 F | DIASTOLIC BLOOD PRESSURE: 88 MMHG | OXYGEN SATURATION: 98 % | HEART RATE: 82 BPM | SYSTOLIC BLOOD PRESSURE: 138 MMHG

## 2023-07-26 DIAGNOSIS — Z01.812 ENCOUNTER FOR PREPROCEDURAL LABORATORY EXAMINATION: ICD-10-CM

## 2023-07-26 DIAGNOSIS — Z33.2 ENCOUNTER FOR ELECTIVE TERMINATION OF PREGNANCY: ICD-10-CM

## 2023-07-26 LAB
BASOPHILS # BLD AUTO: 0.01 K/UL
BASOPHILS NFR BLD AUTO: 0.2 %
EOSINOPHIL # BLD AUTO: 0 K/UL
EOSINOPHIL NFR BLD AUTO: 0 %
HCT VFR BLD CALC: 44.6 %
HGB BLD-MCNC: 15.2 G/DL
LYMPHOCYTES # BLD AUTO: 1.81 K/UL
LYMPHOCYTES NFR BLD AUTO: 35.8 %
MAN DIFF?: NORMAL
MCHC RBC-ENTMCNC: 30.5 PG
MCHC RBC-ENTMCNC: 34.1 GM/DL
MCV RBC AUTO: 89.4 FL
MONOCYTES # BLD AUTO: 0.52 K/UL
MONOCYTES NFR BLD AUTO: 10.3 %
NEUTROPHILS # BLD AUTO: 2.7 K/UL
NEUTROPHILS NFR BLD AUTO: 53.5 %
PLATELET # BLD AUTO: 161 K/UL
RBC # BLD: 4.99 M/UL
RBC # FLD: 13.2 %
WBC # FLD AUTO: 5.05 K/UL

## 2023-07-26 PROCEDURE — 36415 COLL VENOUS BLD VENIPUNCTURE: CPT

## 2023-07-26 PROCEDURE — 76815 OB US LIMITED FETUS(S): CPT

## 2023-07-26 PROCEDURE — 99214 OFFICE O/P EST MOD 30 MIN: CPT | Mod: 57,25

## 2023-07-26 RX ORDER — IBUPROFEN 600 MG/1
600 TABLET, FILM COATED ORAL 4 TIMES DAILY
Qty: 60 | Refills: 0 | Status: ACTIVE | COMMUNITY
Start: 2023-07-26 | End: 1900-01-01

## 2023-07-26 RX ORDER — ONDANSETRON 4 MG/1
4 TABLET ORAL
Qty: 60 | Refills: 0 | Status: ACTIVE | COMMUNITY
Start: 2023-07-26 | End: 1900-01-01

## 2023-07-26 NOTE — PLAN
[FreeTextEntry1] : 34 yo at 7w1d presenting for . All options reviewed. PT requests IV sedation\par \par \par 1. Dilation and Aspiration "Suction D&C"\par - All available medical records reviewed\par - All consents signed today, all questions/concerns addressed\par - Patient offered pamphlet for support services- accepts\par \par 2. Surgery scheduling\par - Patient to be precertified for D+A\par - D+A scheduled for  [facility]\par \par 3. ID/Neuro/cervical prep\par - GC/CT - not indicated\par - doxycycline 200 mg in OR\par - Reviewed Ibuprofen 600 mg po q 6 prn\par \par 4. Labs/Blood type\par  - CBC today\par - TS on arriaval\par - Rh pos\par \par 5. Contraception\par - Patient counseled on all contraceptive options\par - Patient desires post procedure depo, has info sheet and calendar\par \par 6. Post-op\par - Post-operative follow-up phone call virtual visit to be scheduled in 2 weeks\par - pre- and Post-operative instruction sheet given, reviewed bleeding and infection precautions\par - Provided 24 hour contact phone number\par - All questions/concerns of patient addressed to their satisfaction\par

## 2023-07-26 NOTE — PROCEDURE
[Transabdominal OB Sonogram] : Transabdominal OB Sonogram [Intrauterine Pregnancy] : intrauterine pregnancy [Yolk Sac] : yolk sac present [Fetal Heart] : fetal heart present [Current GA by Sonogram: ___ (wks)] : Current GA by Sonogram: [unfilled]Uwks [___ day(s)] : [unfilled] days [Transvaginal OB Sonogram WNL] : Transvaginal OB Sonogram WNL

## 2023-07-26 NOTE — HISTORY OF PRESENT ILLNESS
[FreeTextEntry1] : 32 yo  LMP in  presenting for preop counseling and sonogram for  tomorrow.\par \par All: NKDA\par Meds:d enies\par Obhx: NSVDx2\par Gynhx: D&Cx1\par PMH: HTN no meds\par PSH: adenoids \par SH: current tobacco use 1/3 ppd, no vape use\par \par Dilation and Aspiration Counseling\par \par Options for the pregnancy were discussed with the patient, including continuation of pregnancy, and dilation and curettage (D&C) in the office under local anesthesia or in the operating room under sedation. \par \par Risks of D&A including:\par \par 1.	Infection: Patient was counseled on risk of infection and the use of prophylactic antibiotics, signs/symptoms of pre- and post-operative infection were reviewed. \par 2.	Hemorrhage: Patient was counseled on the risk of hemorrhage, possibly requiring blood (and/or blood products) transfusion, management including use of but not limited to uterotonic medications. PT HAS NO OBJECTIONS TO BLOOD TRANSFUSION OR RECEIVING BLOOD PRODUCTS.\par 3.	Injury/Perforation:  Risk of injury to vagina, cervix, uterus reviewed. Patient was counseled on the risk of uterine perforation with/without need for laparoscopy/laparotomy with/without injury to adjacent organs such as bowel/bladder. Reviewed risk of hysterectomy.\par 4.            Risk of retained products of conception  with/without need for medication or suction procedure to empty the uterus. \par \par The patient also understands it is their responsibility to bring to the attention of their physician any unusual symptoms following the procedure and to report to follow-up examinations. \par  \par They are sure of their decision and deny any coercion from family, friends or healthcare providers. The patient had the opportunity to ask questions and all questions were answered. \par \par Contraceptive Counseling\par \par All forms of reversible contraception including combined hormonal contraception, progestin-only contraceptives, IUDs, and implants were reviewed with the patient.  The risks, benefits, and alternatives were discussed. \par  \par CHC including pill, patch, and ring, was discussed as not being an option secondary to increased risk of stroke.\par \par Progestin-only contraceptives including progestin-only pills, DMPA, the subdermal implant and the hormonal IUDs were discussed with the patient.  The patient was counseled about the risks and benefits of POP's, DMPA, implant, and hormonal IUD contraception. Common side-effects of progestin-only contraceptives including changes in bleeding patterns were reviewed. \par  \par  Pt requesting depoprovera. Had a hormonal IUD x2 months and had terrible pain. \par \par The need to return for injections Q 3 months was reviewed. Typical effectiveness rates of 94% were reviewed. The potential for irregular bleeding associated with DMPA was reviewed and all questions answered.\par  \par \par They are aware that hormonal contraceptives, IUDs and implants do not protect against sexually transmitted diseases and encouraged them to use condoms with their contraception if they are at risk for a STD.\par  \par They were also told to call with any problematic side-effects to discuss management or changing to another contraceptive method before discontinuing.\par

## 2023-07-27 ENCOUNTER — OUTPATIENT (OUTPATIENT)
Dept: INPATIENT UNIT | Facility: HOSPITAL | Age: 33
LOS: 1 days | End: 2023-07-27
Payer: MEDICAID

## 2023-07-27 ENCOUNTER — RESULT REVIEW (OUTPATIENT)
Age: 33
End: 2023-07-27

## 2023-07-27 ENCOUNTER — TRANSCRIPTION ENCOUNTER (OUTPATIENT)
Age: 33
End: 2023-07-27

## 2023-07-27 VITALS
DIASTOLIC BLOOD PRESSURE: 58 MMHG | WEIGHT: 240.08 LBS | SYSTOLIC BLOOD PRESSURE: 128 MMHG | HEART RATE: 95 BPM | HEIGHT: 69 IN | RESPIRATION RATE: 18 BRPM | TEMPERATURE: 98 F | OXYGEN SATURATION: 100 %

## 2023-07-27 VITALS
RESPIRATION RATE: 16 BRPM | TEMPERATURE: 98 F | SYSTOLIC BLOOD PRESSURE: 121 MMHG | OXYGEN SATURATION: 100 % | DIASTOLIC BLOOD PRESSURE: 74 MMHG | HEART RATE: 76 BPM

## 2023-07-27 DIAGNOSIS — Z90.89 ACQUIRED ABSENCE OF OTHER ORGANS: Chronic | ICD-10-CM

## 2023-07-27 DIAGNOSIS — Z34.91 ENCOUNTER FOR SUPERVISION OF NORMAL PREGNANCY, UNSPECIFIED, FIRST TRIMESTER: ICD-10-CM

## 2023-07-27 DIAGNOSIS — Z33.2 ENCOUNTER FOR ELECTIVE TERMINATION OF PREGNANCY: ICD-10-CM

## 2023-07-27 LAB — BLD GP AB SCN SERPL QL: SIGNIFICANT CHANGE UP

## 2023-07-27 PROCEDURE — 86900 BLOOD TYPING SEROLOGIC ABO: CPT

## 2023-07-27 PROCEDURE — 36415 COLL VENOUS BLD VENIPUNCTURE: CPT

## 2023-07-27 PROCEDURE — 59841 INDUCED ABORTION DILAT&EVAC: CPT

## 2023-07-27 PROCEDURE — 76998 US GUIDE INTRAOP: CPT | Mod: 26,GC

## 2023-07-27 PROCEDURE — 59840 INDUCED ABORTION D&C: CPT | Mod: GC

## 2023-07-27 PROCEDURE — 88305 TISSUE EXAM BY PATHOLOGIST: CPT

## 2023-07-27 PROCEDURE — 86850 RBC ANTIBODY SCREEN: CPT

## 2023-07-27 PROCEDURE — 86901 BLOOD TYPING SEROLOGIC RH(D): CPT

## 2023-07-27 PROCEDURE — 88305 TISSUE EXAM BY PATHOLOGIST: CPT | Mod: 26

## 2023-07-27 RX ORDER — ONDANSETRON 8 MG/1
4 TABLET, FILM COATED ORAL ONCE
Refills: 0 | Status: COMPLETED | OUTPATIENT
Start: 2023-07-27 | End: 2023-07-27

## 2023-07-27 RX ORDER — ONDANSETRON 8 MG/1
4 TABLET, FILM COATED ORAL ONCE
Refills: 0 | Status: DISCONTINUED | OUTPATIENT
Start: 2023-07-27 | End: 2023-07-27

## 2023-07-27 RX ORDER — SODIUM CHLORIDE 9 MG/ML
1000 INJECTION, SOLUTION INTRAVENOUS
Refills: 0 | Status: DISCONTINUED | OUTPATIENT
Start: 2023-07-27 | End: 2023-07-27

## 2023-07-27 RX ORDER — OXYCODONE HYDROCHLORIDE 5 MG/1
5 TABLET ORAL ONCE
Refills: 0 | Status: DISCONTINUED | OUTPATIENT
Start: 2023-07-27 | End: 2023-07-27

## 2023-07-27 RX ORDER — FENTANYL CITRATE 50 UG/ML
25 INJECTION INTRAVENOUS
Refills: 0 | Status: DISCONTINUED | OUTPATIENT
Start: 2023-07-27 | End: 2023-07-27

## 2023-07-27 RX ORDER — ACETAMINOPHEN 500 MG
975 TABLET ORAL ONCE
Refills: 0 | Status: COMPLETED | OUTPATIENT
Start: 2023-07-27 | End: 2023-07-27

## 2023-07-27 RX ADMIN — Medication 200 MILLIGRAM(S): at 10:45

## 2023-07-27 RX ADMIN — Medication 975 MILLIGRAM(S): at 09:13

## 2023-07-27 RX ADMIN — ONDANSETRON 4 MILLIGRAM(S): 8 TABLET, FILM COATED ORAL at 09:57

## 2023-07-27 NOTE — BRIEF OPERATIVE NOTE - NSICDXBRIEFPROCEDURE_GEN_ALL_CORE_FT
PROCEDURES:  Suction curettage or dilation and curettage, uterus, for termination of pregnancy 27-Jul-2023 11:12:50  Ty Ulrich

## 2023-07-27 NOTE — BRIEF OPERATIVE NOTE - NSICDXBRIEFPREOP_GEN_ALL_CORE_FT
PRE-OP DIAGNOSIS:  Unwanted pregnancy 27-Jul-2023 11:13:04  Ty Ulrich  7 weeks gestation of pregnancy 27-Jul-2023 11:12:58  Ty Ulrich

## 2023-07-27 NOTE — BRIEF OPERATIVE NOTE - OPERATION/FINDINGS
7 week sized uterus, cervix soft and about 0.5 cm dilated.  Procedure completed under U/S guidance, thin endometrial stripe noted at completion of case  Excellent hemostasis achieved.

## 2023-07-27 NOTE — ASU DISCHARGE PLAN (ADULT/PEDIATRIC) - CALL YOUR DOCTOR IF YOU HAVE ANY OF THE FOLLOWING:
Bleeding that does not stop/Fever greater than (need to indicate Fahrenheit or Celsius)/Wound/Surgical Site with redness, or foul smelling discharge or pus Bleeding that does not stop/Pain not relieved by Medications/Fever greater than (need to indicate Fahrenheit or Celsius)/Wound/Surgical Site with redness, or foul smelling discharge or pus/Unable to urinate

## 2023-07-27 NOTE — ASU PREOP CHECKLIST - ISOLATION PRECAUTIONS
General appearance - alert, well appearing, and in no distress  Mental status - alert, oriented to person, place, and time  Head- normocephalic, no lesions, without obvious abnormality  Eyes - pupils equal and reactive, extraocular eye movements intact  Ears - bilateral TM's and external ear canals normal  Nose - normal and patent, no erythema, discharge or polyps  Mouth - mucous membranes moist, pharynx normal without lesions  Neck - supple, no significant adenopathy  Chest - clear to auscultation, no wheezes, rales or rhonchi, symmetric air entry  Heart - normal rate, regular rhythm, normal S1, S2, no murmurs, rubs, clicks or gallops  Abdomen - soft, nondistended, no masses or organomegaly, mild tenderness in epigastric region  Neurological - alert, oriented, normal speech, no focal findings or movement disorder noted  Extremities - peripheral pulses normal, no pedal edema, no clubbing or cyanosis  Skin - normal coloration and turgor, no rashes, no suspicious skin lesions noted     Labs:-    CBC:   Recent Labs      11/16/18   2153  11/17/18   0553  11/18/18   0555   WBC  5.6  4.9  4.5   HGB  9.9*  9.9*  10.0*   PLT  261  197  189     BMP:    Recent Labs      11/16/18   1509  11/17/18   0553  11/18/18   0555   NA  142  141  137   K  4.4  3.8  3.8   CL  102  103  104   CO2  26  25  23   BUN  19  16  8   CREATININE  0.70  0.70  0.57*   GLUCOSE  116*  98  100*     Calcium:  Recent Labs      11/18/18   0555   CALCIUM  8.4*     Ionized Calcium:No results for input(s): IONCA in the last 72 hours. Magnesium:No results for input(s): MG in the last 72 hours. Phosphorus:No results for input(s): PHOS in the last 72 hours. BNP:No results for input(s): BNP in the last 72 hours. Glucose:No results for input(s): POCGLU in the last 72 hours. HgbA1C: No results for input(s): LABA1C in the last 72 hours. INR: No results for input(s): INR in the last 72 hours.   Hepatic:   Recent Labs      11/16/18   1509   ALKPHOS  109 ALT  8   AST  18   PROT  7.1   BILITOT  0.83   BILIDIR  0.42*   LABALBU  3.6     Amylase and Lipase:No results for input(s): LACTA, AMYLASE in the last 72 hours. Lactic Acid: No results for input(s): LACTA in the last 72 hours. CARDIAC ENZYMES:No results for input(s): CKTOTAL, CKMB, CKMBINDEX, TROPONINI in the last 72 hours. BNP: No results for input(s): BNP in the last 72 hours. Lipids: No results for input(s): CHOL, TRIG, HDL, LDLCALC in the last 72 hours.     Invalid input(s): LDL  ABGs: No results found for: PH, PCO2, PO2, HCO3, O2SAT  Thyroid:   Lab Results   Component Value Date    TSH 2.74 01/04/2016      Urinalysis:   Clarity, UA   Date Value Ref Range Status   08/23/2018 Clear  Final     Color, UA   Date Value Ref Range Status   11/16/2018 DARK YELLOW (A) YEL Final     pH, UA   Date Value Ref Range Status   11/16/2018 5.0 5.0 - 8.0 Final     Specific Gravity, UA   Date Value Ref Range Status   11/16/2018 1.020 1.005 - 1.030 Final     Protein, UA   Date Value Ref Range Status   11/16/2018 NEGATIVE NEG Final     RBC, UA   Date Value Ref Range Status   11/16/2018 2 TO 5 0 - 2 /HPF Final     Blood, UA POC   Date Value Ref Range Status   08/23/2018 Non-Hemolyzed Trace  Final     Bacteria, UA   Date Value Ref Range Status   11/16/2018 FEW (A) NONE Final     Nitrite, Urine   Date Value Ref Range Status   11/16/2018 NEGATIVE NEG Final     WBC, UA   Date Value Ref Range Status   11/16/2018 20 TO 50 0 - 5 /HPF Final     Leukocyte Esterase, Urine   Date Value Ref Range Status   11/16/2018 MODERATE (A) NEG Final     Yeast, UA   Date Value Ref Range Status   11/16/2018 NOT REPORTED NONE Final     Glucose, Ur   Date Value Ref Range Status   11/16/2018 NEGATIVE NEG Final     Bilirubin, UA   Date Value Ref Range Status   08/23/2018 Neg  Final     Bilirubin Urine   Date Value Ref Range Status   11/16/2018 NEGATIVE NEG Final       Assessment:  Active Problems:    Nausea & vomiting  Resolved Problems:    * No resolved none

## 2023-07-27 NOTE — BRIEF OPERATIVE NOTE - NSICDXBRIEFPOSTOP_GEN_ALL_CORE_FT
POST-OP DIAGNOSIS:  7 weeks gestation of pregnancy 27-Jul-2023 11:13:13  Ty Ulrich  Unwanted pregnancy 27-Jul-2023 11:13:09  Ty Ulrich

## 2023-07-28 ENCOUNTER — NON-APPOINTMENT (OUTPATIENT)
Age: 33
End: 2023-07-28

## 2023-08-09 ENCOUNTER — APPOINTMENT (OUTPATIENT)
Dept: OBGYN | Facility: CLINIC | Age: 33
End: 2023-08-09
Payer: MEDICAID

## 2023-08-09 PROCEDURE — 99024 POSTOP FOLLOW-UP VISIT: CPT

## 2023-08-09 NOTE — HISTORY OF PRESENT ILLNESS
[FreeTextEntry1] : Audiovisual Televisit Pt location: Home, [facility] Provider location: office, [address] [place] [address], [place], [place]  32 yo s/p induced  in the OR ~ 7 weeks 2 weeks ago doing well. No bleeding, continued mild cramping relieved with ibuprofen. Currently taking minipill.  Pt has appt with Dr. Benson tomorrow. Will discuss salpingectomy for permanent contraception.
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
## 2023-08-10 ENCOUNTER — APPOINTMENT (OUTPATIENT)
Dept: OBGYN | Facility: CLINIC | Age: 33
End: 2023-08-10
Payer: MEDICAID

## 2023-08-10 VITALS
HEIGHT: 69 IN | WEIGHT: 230 LBS | BODY MASS INDEX: 34.07 KG/M2 | SYSTOLIC BLOOD PRESSURE: 150 MMHG | DIASTOLIC BLOOD PRESSURE: 94 MMHG

## 2023-08-10 DIAGNOSIS — Z01.419 ENCOUNTER FOR GYNECOLOGICAL EXAMINATION (GENERAL) (ROUTINE) W/OUT ABNORMAL FINDINGS: ICD-10-CM

## 2023-08-10 PROCEDURE — 99395 PREV VISIT EST AGE 18-39: CPT

## 2023-08-10 NOTE — HISTORY OF PRESENT ILLNESS
[FreeTextEntry1] : 33-year-old -American woman  3 para 2-0-1-2 presents for annual visit.  She is sexually active and recently had a termination of pregnancy with Dr. Barnett.  It was performed surgically only a few weeks ago.  She has not had a period since then.  Patient wants to consider tying her tubes.  Patient is currently breast-feeding.  She was on the progesterone only pill when she got pregnant.  She has a medical history of hypertension but is not compliant with her medications.  Surgical history of adenoids.  OB history significant for 2 vaginal deliveries.  She is single.  She smokes a third of a pack a day; occasional alcohol use no drug use.

## 2023-08-10 NOTE — DISCUSSION/SUMMARY
[FreeTextEntry1] : Pap smear is performed.  Patient is still having some lower abdominal discomfort and will return for sonogram as she recently had a surgical termination.  Regarding birth control I had a long discussion.  I discussed long-acting methods such as IUDs or permanent methods.  Patient is committed to having something permanent done.  We discussed laparoscopic bilateral salpingectomy.  I discussed the irreversible nature of the procedure.  I discussed risks and benefits including risk of bleeding infection injury need for laparotomy etc.  For now patient will return to see us for sonogram prior to scheduling it.

## 2023-08-14 LAB
HPV HIGH+LOW RISK DNA PNL CVX: NOT DETECTED
SURGICAL PATHOLOGY STUDY: SIGNIFICANT CHANGE UP

## 2023-08-16 LAB — CYTOLOGY CVX/VAG DOC THIN PREP: ABNORMAL

## 2023-08-23 ENCOUNTER — APPOINTMENT (OUTPATIENT)
Dept: ANTEPARTUM | Facility: CLINIC | Age: 33
End: 2023-08-23

## 2023-08-24 ENCOUNTER — APPOINTMENT (OUTPATIENT)
Dept: OBGYN | Facility: CLINIC | Age: 33
End: 2023-08-24

## 2023-12-07 ENCOUNTER — APPOINTMENT (OUTPATIENT)
Dept: OBGYN | Facility: CLINIC | Age: 33
End: 2023-12-07
Payer: MEDICAID

## 2023-12-07 VITALS
DIASTOLIC BLOOD PRESSURE: 79 MMHG | WEIGHT: 218 LBS | HEIGHT: 69 IN | BODY MASS INDEX: 32.29 KG/M2 | SYSTOLIC BLOOD PRESSURE: 126 MMHG

## 2023-12-07 DIAGNOSIS — G89.18 OTHER ACUTE POSTPROCEDURAL PAIN: ICD-10-CM

## 2023-12-07 PROCEDURE — 99214 OFFICE O/P EST MOD 30 MIN: CPT

## 2023-12-07 RX ORDER — IBUPROFEN 600 MG/1
600 TABLET, FILM COATED ORAL EVERY 6 HOURS
Qty: 60 | Refills: 0 | Status: ACTIVE | COMMUNITY
Start: 2023-12-07 | End: 1900-01-01

## 2024-01-04 ENCOUNTER — APPOINTMENT (OUTPATIENT)
Dept: OBGYN | Facility: CLINIC | Age: 34
End: 2024-01-04
Payer: MEDICAID

## 2024-01-04 DIAGNOSIS — Z30.09 ENCOUNTER FOR OTHER GENERAL COUNSELING AND ADVICE ON CONTRACEPTION: ICD-10-CM

## 2024-01-04 DIAGNOSIS — O00.109 UNSPECIFIED TUBAL PREGNANCY WITHOUT INTRAUTERINE PREGNANCY: ICD-10-CM

## 2024-01-04 PROCEDURE — 99214 OFFICE O/P EST MOD 30 MIN: CPT

## 2024-01-04 RX ORDER — NORETHINDRONE 0.35 MG/1
0.35 TABLET ORAL DAILY
Qty: 84 | Refills: 1 | Status: ACTIVE | COMMUNITY
Start: 2022-12-20 | End: 1900-01-01

## 2024-01-04 NOTE — PHYSICAL EXAM
[FreeTextEntry7] : inc: c/d/i x 3 [Labia Majora] : normal [Labia Minora] : normal [Normal] : normal [Uterine Adnexae] : normal

## 2024-01-04 NOTE — HISTORY OF PRESENT ILLNESS
[FreeTextEntry1] : 33-year-old -American woman  4 para 2 here for follow-up visit.  Patient is status post laparoscopic left salpingectomy at [facility] on 2023 for ruptured left ectopic pregnancy.  Patient was noted to have dense intra-abdominal adhesions during the procedure.  She denies any history of PID.  Patient has history of chronic hypertension and takes a progesterone only pill.  She is currently having relationship issues and wishes to preserve her fertility.  Previously she had been considering permanent methods.

## 2024-01-04 NOTE — DISCUSSION/SUMMARY
[FreeTextEntry1] : I had a lengthy discussion with the patient regarding the clinical situation.  Using pictorial aids I discussed the pathophysiology of ectopic pregnancy.  I discussed risk of scarring inside her fallopian tubes.  I discussed high risk of recurrent ectopic because of issues with the contralateral tube.  In general I discussed being on contraception until she is ready to conceive and then verifying that the pregnancy is inside the uterus right away by calling her physician.  I also discussed at some point possibility of doing an HSG to check patency of her left tube.  For now patient is not pondering a pregnancy.  I discussed her birth control options.  She has chronic hypertension and this is a relative contraindication to oral contraceptives.  She is continuing on progesterone only pills and that is refilled now.  I again discussed the option of an IUD and she has not liked this in the past because she said she could feel the string coming out and it was painful.  I discussed considering the Tanisha IUD although it is only good for 3 years.  All questions were answered.

## 2024-03-14 ENCOUNTER — APPOINTMENT (OUTPATIENT)
Dept: OBGYN | Facility: CLINIC | Age: 34
End: 2024-03-14

## 2024-08-15 NOTE — OB RN TRIAGE NOTE - FALL HARM RISK - FALL HARM RISK
LVM stating I was calling to see how the medication prescribed during her last appointment was working and to give our clinic a call back.      ----- Message from Cristina Davila MA sent at 8/12/2024  2:47 PM CDT -----  Regarding: Med efficacy  Call pt for med eficacy   No indicators present

## (undated) DEVICE — TUBING MEDI-VAC W MAXIGRIP CONNECTORS 1/4"X6'

## (undated) DEVICE — PREP DYNA-HEX CHG 4% 4OZ BOTTLE (BACTOSHIELD)

## (undated) DEVICE — TUBING ASPIRATION HANDLE

## (undated) DEVICE — DRAPE LIGHT HANDLE COVER (GREEN)

## (undated) DEVICE — PREP TRAY DRY SKIN PREP SCRUB

## (undated) DEVICE — SUCTION YANKAUER TAPERED BULBOUS NO VENT

## (undated) DEVICE — WARMING BLANKET UPPER ADULT

## (undated) DEVICE — GLV 8.5 PROTEXIS (WHITE)

## (undated) DEVICE — PACK LITHOTOMY

## (undated) DEVICE — VENODYNE/SCD SLEEVE CALF MEDIUM

## (undated) DEVICE — LAP PAD W RING 18 X 18"

## (undated) DEVICE — NDL COUNTER FOAM AND MAGNET 40-70